# Patient Record
Sex: MALE | Race: OTHER | HISPANIC OR LATINO | URBAN - METROPOLITAN AREA
[De-identification: names, ages, dates, MRNs, and addresses within clinical notes are randomized per-mention and may not be internally consistent; named-entity substitution may affect disease eponyms.]

---

## 2022-01-16 ENCOUNTER — INPATIENT (INPATIENT)
Facility: HOSPITAL | Age: 77
LOS: 5 days | Discharge: HOME | DRG: 378 | End: 2022-01-22
Attending: HOSPITALIST | Admitting: STUDENT IN AN ORGANIZED HEALTH CARE EDUCATION/TRAINING PROGRAM
Payer: COMMERCIAL

## 2022-01-16 VITALS
OXYGEN SATURATION: 97 % | HEIGHT: 68 IN | DIASTOLIC BLOOD PRESSURE: 61 MMHG | WEIGHT: 210.1 LBS | TEMPERATURE: 98 F | HEART RATE: 81 BPM | SYSTOLIC BLOOD PRESSURE: 106 MMHG

## 2022-01-16 DIAGNOSIS — K92.1 MELENA: ICD-10-CM

## 2022-01-16 LAB
ALBUMIN SERPL ELPH-MCNC: 4.2 G/DL — SIGNIFICANT CHANGE UP (ref 3.3–5)
ALP SERPL-CCNC: 52 U/L — SIGNIFICANT CHANGE UP (ref 40–120)
ALT FLD-CCNC: 35 U/L — SIGNIFICANT CHANGE UP (ref 10–45)
ANION GAP SERPL CALC-SCNC: 13 MMOL/L — SIGNIFICANT CHANGE UP (ref 5–17)
APTT BLD: 24.8 SEC — LOW (ref 27.5–35.5)
AST SERPL-CCNC: 26 U/L — SIGNIFICANT CHANGE UP (ref 10–40)
BASE EXCESS BLDV CALC-SCNC: -0.3 MMOL/L — SIGNIFICANT CHANGE UP (ref -2–2)
BASOPHILS # BLD AUTO: 0.07 K/UL — SIGNIFICANT CHANGE UP (ref 0–0.2)
BASOPHILS NFR BLD AUTO: 0.4 % — SIGNIFICANT CHANGE UP (ref 0–2)
BILIRUB SERPL-MCNC: 0.4 MG/DL — SIGNIFICANT CHANGE UP (ref 0.2–1.2)
BLD GP AB SCN SERPL QL: NEGATIVE — SIGNIFICANT CHANGE UP
BUN SERPL-MCNC: 50 MG/DL — HIGH (ref 7–23)
CA-I SERPL-SCNC: 1.29 MMOL/L — SIGNIFICANT CHANGE UP (ref 1.15–1.33)
CALCIUM SERPL-MCNC: 9.9 MG/DL — SIGNIFICANT CHANGE UP (ref 8.4–10.5)
CHLORIDE BLDV-SCNC: 100 MMOL/L — SIGNIFICANT CHANGE UP (ref 96–108)
CHLORIDE SERPL-SCNC: 99 MMOL/L — SIGNIFICANT CHANGE UP (ref 96–108)
CO2 BLDV-SCNC: 29 MMOL/L — HIGH (ref 22–26)
CO2 SERPL-SCNC: 23 MMOL/L — SIGNIFICANT CHANGE UP (ref 22–31)
CREAT SERPL-MCNC: 1.31 MG/DL — HIGH (ref 0.5–1.3)
EOSINOPHIL # BLD AUTO: 0.04 K/UL — SIGNIFICANT CHANGE UP (ref 0–0.5)
EOSINOPHIL NFR BLD AUTO: 0.2 % — SIGNIFICANT CHANGE UP (ref 0–6)
GAS PNL BLDV: 133 MMOL/L — LOW (ref 136–145)
GAS PNL BLDV: SIGNIFICANT CHANGE UP
GAS PNL BLDV: SIGNIFICANT CHANGE UP
GLUCOSE BLDV-MCNC: 150 MG/DL — HIGH (ref 70–99)
GLUCOSE SERPL-MCNC: 159 MG/DL — HIGH (ref 70–99)
HCO3 BLDV-SCNC: 27 MMOL/L — SIGNIFICANT CHANGE UP (ref 22–29)
HCT VFR BLD CALC: 39 % — SIGNIFICANT CHANGE UP (ref 39–50)
HCT VFR BLDA CALC: 40 % — SIGNIFICANT CHANGE UP (ref 39–51)
HGB BLD CALC-MCNC: 13.2 G/DL — SIGNIFICANT CHANGE UP (ref 12.6–17.4)
HGB BLD-MCNC: 13 G/DL — SIGNIFICANT CHANGE UP (ref 13–17)
IMM GRANULOCYTES NFR BLD AUTO: 1.2 % — SIGNIFICANT CHANGE UP (ref 0–1.5)
INR BLD: 1.14 RATIO — SIGNIFICANT CHANGE UP (ref 0.88–1.16)
LACTATE BLDV-MCNC: 2.8 MMOL/L — HIGH (ref 0.7–2)
LYMPHOCYTES # BLD AUTO: 1.64 K/UL — SIGNIFICANT CHANGE UP (ref 1–3.3)
LYMPHOCYTES # BLD AUTO: 10 % — LOW (ref 13–44)
MAGNESIUM SERPL-MCNC: 1.7 MG/DL — SIGNIFICANT CHANGE UP (ref 1.6–2.6)
MCHC RBC-ENTMCNC: 32.4 PG — SIGNIFICANT CHANGE UP (ref 27–34)
MCHC RBC-ENTMCNC: 33.3 GM/DL — SIGNIFICANT CHANGE UP (ref 32–36)
MCV RBC AUTO: 97.3 FL — SIGNIFICANT CHANGE UP (ref 80–100)
MONOCYTES # BLD AUTO: 0.88 K/UL — SIGNIFICANT CHANGE UP (ref 0–0.9)
MONOCYTES NFR BLD AUTO: 5.4 % — SIGNIFICANT CHANGE UP (ref 2–14)
NEUTROPHILS # BLD AUTO: 13.6 K/UL — HIGH (ref 1.8–7.4)
NEUTROPHILS NFR BLD AUTO: 82.8 % — HIGH (ref 43–77)
NRBC # BLD: 0 /100 WBCS — SIGNIFICANT CHANGE UP (ref 0–0)
PCO2 BLDV: 55 MMHG — SIGNIFICANT CHANGE UP (ref 42–55)
PH BLDV: 7.3 — LOW (ref 7.32–7.43)
PHOSPHATE SERPL-MCNC: 2.8 MG/DL — SIGNIFICANT CHANGE UP (ref 2.5–4.5)
PLATELET # BLD AUTO: 294 K/UL — SIGNIFICANT CHANGE UP (ref 150–400)
PO2 BLDV: 18 MMHG — LOW (ref 25–45)
POTASSIUM BLDV-SCNC: 5.6 MMOL/L — HIGH (ref 3.5–5.1)
POTASSIUM SERPL-MCNC: 4.8 MMOL/L — SIGNIFICANT CHANGE UP (ref 3.5–5.3)
POTASSIUM SERPL-SCNC: 4.8 MMOL/L — SIGNIFICANT CHANGE UP (ref 3.5–5.3)
PROT SERPL-MCNC: 7.1 G/DL — SIGNIFICANT CHANGE UP (ref 6–8.3)
PROTHROM AB SERPL-ACNC: 13.6 SEC — SIGNIFICANT CHANGE UP (ref 10.6–13.6)
RBC # BLD: 4.01 M/UL — LOW (ref 4.2–5.8)
RBC # FLD: 11.9 % — SIGNIFICANT CHANGE UP (ref 10.3–14.5)
RH IG SCN BLD-IMP: POSITIVE — SIGNIFICANT CHANGE UP
SAO2 % BLDV: 21.5 % — LOW (ref 67–88)
SODIUM SERPL-SCNC: 135 MMOL/L — SIGNIFICANT CHANGE UP (ref 135–145)
WBC # BLD: 16.43 K/UL — HIGH (ref 3.8–10.5)
WBC # FLD AUTO: 16.43 K/UL — HIGH (ref 3.8–10.5)

## 2022-01-16 PROCEDURE — 93010 ELECTROCARDIOGRAM REPORT: CPT

## 2022-01-16 PROCEDURE — 99285 EMERGENCY DEPT VISIT HI MDM: CPT

## 2022-01-16 RX ORDER — PANTOPRAZOLE SODIUM 20 MG/1
80 TABLET, DELAYED RELEASE ORAL ONCE
Refills: 0 | Status: COMPLETED | OUTPATIENT
Start: 2022-01-16 | End: 2022-01-16

## 2022-01-16 RX ORDER — SODIUM CHLORIDE 9 MG/ML
1000 INJECTION, SOLUTION INTRAVENOUS ONCE
Refills: 0 | Status: COMPLETED | OUTPATIENT
Start: 2022-01-16 | End: 2022-01-16

## 2022-01-16 RX ORDER — SUCRALFATE 1 G
1 TABLET ORAL ONCE
Refills: 0 | Status: COMPLETED | OUTPATIENT
Start: 2022-01-16 | End: 2022-01-16

## 2022-01-16 RX ADMIN — SODIUM CHLORIDE 1000 MILLILITER(S): 9 INJECTION, SOLUTION INTRAVENOUS at 22:16

## 2022-01-16 RX ADMIN — PANTOPRAZOLE SODIUM 80 MILLIGRAM(S): 20 TABLET, DELAYED RELEASE ORAL at 21:13

## 2022-01-16 RX ADMIN — Medication 1 GRAM(S): at 21:14

## 2022-01-16 NOTE — ED PROVIDER NOTE - CLINICAL SUMMARY MEDICAL DECISION MAKING FREE TEXT BOX
Lucie - Pt is a 75 yo M with HTN and depression who presents with near syncope. concern for GI bleed given melena. cardiac or neuro cause for syncope less likely. will check cbc, cmp, ekg, coags, cxr Lucie - Pt is a 75 yo M with HTN and depression who presents with near syncope. concern for GI bleed given melena. cardiac or neuro cause for syncope less likely. will check cbc, cmp, ekg, coags, cxr    Attending Sakshi: 75 y/o M w/ PMH of HTN, CAD s/p stent on ASA presenting w/ dizziness. Seen in [facility], BIBEMS.  [id number]. Reportedly 2 episodes of dizziness yesterday and today. Described as feeling as if he was going to lose consciousness. Had been feeling well until this started, in usual state of health. Noticed while on EMS stretcher that there was blood on the sheets. Denies blood in the urine or stool that he is aware of. Pt well appearing on exam, no acute distress. Lungs clear. HR regular. Abd nondistended/soft/nontender. Rectal exam w/ melena. Pt presenting w/ dizziness in the setting of melena. On ASA. Suspect likely UGIB. HD stable at this time. Plan for labs, meds, EKG, CXR. Pt will require admission. Will reassess the need for additional interventions as clinically warranted.

## 2022-01-16 NOTE — ED PROVIDER NOTE - OBJECTIVE STATEMENT
Pt is a 77 yo M Pt is a 77 yo M with HTN and depression who presents with near syncope. Today and yesterday pt had 2 episodes of dizziness causing near syncope though pt denies LOC or hitting head. Has not noticed any melena or BRBPR until with EMS today. Denies chest pain, SOB, N/V, fevers, chills. No abd pain. Normal bowel movements, no urinary symptoms. No palpitations, headache, weakness, numbness. not on AC

## 2022-01-16 NOTE — ED PROVIDER NOTE - PHYSICAL EXAMINATION
Denisse Faulkner MD  GENERAL: Patient awake alert NAD.  HEENT: NC/AT, Moist mucous membranes, PERRL, EOMI.  LUNGS: CTAB, no wheezes or crackles.   CARDIAC: RRR, no m/r/g.    ABDOMEN: Soft, NT, ND, No rebound, guarding. No CVA tenderness.   rectal exam: scant melena. no brisk bleeding, no large melena BM  EXT: No edema. No calf tenderness.  MSK: no pain with movement, no deformities.  NEURO: A&Ox3. Moving all extremities.  SKIN: Warm and dry. No rash.  PSYCH: Normal affect.

## 2022-01-16 NOTE — ED ADULT NURSE REASSESSMENT NOTE - NS ED NURSE REASSESS COMMENT FT1
Pt had episode of bloody BM in ED. MD Cantor aware. Pt a&ox3, asymptomatic, mentating well. Pt cleaned up and changed, provided new linens and gown, placed in position of comfort. Pt NSR on CM, pending dispo

## 2022-01-16 NOTE — ED PROVIDER NOTE - ATTENDING CONTRIBUTION TO CARE
Attending Sakshi: I performed a history and physical exam of the patient and discussed their management with the resident/fellow/ACP/student. I have reviewed the resident/fellow/ACP/student note and agree with the documented findings and plan of care, except as noted. I have personally performed a substantive portion of the visit including all aspects of the medical decision making. My medical decision making and observations are found above. Please refer to any progress notes for updates on clinical course.

## 2022-01-17 DIAGNOSIS — K92.1 MELENA: ICD-10-CM

## 2022-01-17 DIAGNOSIS — D62 ACUTE POSTHEMORRHAGIC ANEMIA: ICD-10-CM

## 2022-01-17 DIAGNOSIS — Z29.9 ENCOUNTER FOR PROPHYLACTIC MEASURES, UNSPECIFIED: ICD-10-CM

## 2022-01-17 DIAGNOSIS — D72.829 ELEVATED WHITE BLOOD CELL COUNT, UNSPECIFIED: ICD-10-CM

## 2022-01-17 DIAGNOSIS — R55 SYNCOPE AND COLLAPSE: ICD-10-CM

## 2022-01-17 DIAGNOSIS — I25.10 ATHEROSCLEROTIC HEART DISEASE OF NATIVE CORONARY ARTERY WITHOUT ANGINA PECTORIS: ICD-10-CM

## 2022-01-17 DIAGNOSIS — N17.9 ACUTE KIDNEY FAILURE, UNSPECIFIED: ICD-10-CM

## 2022-01-17 LAB
ANION GAP SERPL CALC-SCNC: 14 MMOL/L — SIGNIFICANT CHANGE UP (ref 5–17)
APPEARANCE UR: CLEAR — SIGNIFICANT CHANGE UP
BACTERIA # UR AUTO: NEGATIVE — SIGNIFICANT CHANGE UP
BASE EXCESS BLDV CALC-SCNC: 1.5 MMOL/L — SIGNIFICANT CHANGE UP (ref -2–2)
BILIRUB UR-MCNC: NEGATIVE — SIGNIFICANT CHANGE UP
BUN SERPL-MCNC: 40 MG/DL — HIGH (ref 7–23)
CA-I SERPL-SCNC: 1.31 MMOL/L — SIGNIFICANT CHANGE UP (ref 1.15–1.33)
CALCIUM SERPL-MCNC: 9.5 MG/DL — SIGNIFICANT CHANGE UP (ref 8.4–10.5)
CHLORIDE BLDV-SCNC: 99 MMOL/L — SIGNIFICANT CHANGE UP (ref 96–108)
CHLORIDE SERPL-SCNC: 101 MMOL/L — SIGNIFICANT CHANGE UP (ref 96–108)
CO2 BLDV-SCNC: 30 MMOL/L — HIGH (ref 22–26)
CO2 SERPL-SCNC: 21 MMOL/L — LOW (ref 22–31)
COLOR SPEC: SIGNIFICANT CHANGE UP
CREAT SERPL-MCNC: 0.88 MG/DL — SIGNIFICANT CHANGE UP (ref 0.5–1.3)
DIFF PNL FLD: NEGATIVE — SIGNIFICANT CHANGE UP
EPI CELLS # UR: 0 /HPF — SIGNIFICANT CHANGE UP
GAS PNL BLDV: 132 MMOL/L — LOW (ref 136–145)
GAS PNL BLDV: SIGNIFICANT CHANGE UP
GAS PNL BLDV: SIGNIFICANT CHANGE UP
GLUCOSE BLDV-MCNC: 150 MG/DL — HIGH (ref 70–99)
GLUCOSE SERPL-MCNC: 111 MG/DL — HIGH (ref 70–99)
GLUCOSE UR QL: NEGATIVE — SIGNIFICANT CHANGE UP
HCO3 BLDV-SCNC: 28 MMOL/L — SIGNIFICANT CHANGE UP (ref 22–29)
HCT VFR BLD CALC: 31.9 % — LOW (ref 39–50)
HCT VFR BLD CALC: 34.1 % — LOW (ref 39–50)
HCT VFR BLD CALC: 35.1 % — LOW (ref 39–50)
HCT VFR BLD CALC: 35.8 % — LOW (ref 39–50)
HCT VFR BLDA CALC: 36 % — LOW (ref 39–51)
HGB BLD CALC-MCNC: 12 G/DL — LOW (ref 12.6–17.4)
HGB BLD-MCNC: 10.9 G/DL — LOW (ref 13–17)
HGB BLD-MCNC: 11.6 G/DL — LOW (ref 13–17)
HGB BLD-MCNC: 12 G/DL — LOW (ref 13–17)
HGB BLD-MCNC: 12 G/DL — LOW (ref 13–17)
HOROWITZ INDEX BLDV+IHG-RTO: SIGNIFICANT CHANGE UP
HYALINE CASTS # UR AUTO: 1 /LPF — SIGNIFICANT CHANGE UP (ref 0–2)
KETONES UR-MCNC: ABNORMAL
LACTATE BLDV-MCNC: 2.4 MMOL/L — HIGH (ref 0.7–2)
LEUKOCYTE ESTERASE UR-ACNC: NEGATIVE — SIGNIFICANT CHANGE UP
MAGNESIUM SERPL-MCNC: 1.7 MG/DL — SIGNIFICANT CHANGE UP (ref 1.6–2.6)
MCHC RBC-ENTMCNC: 32.4 PG — SIGNIFICANT CHANGE UP (ref 27–34)
MCHC RBC-ENTMCNC: 32.4 PG — SIGNIFICANT CHANGE UP (ref 27–34)
MCHC RBC-ENTMCNC: 32.8 PG — SIGNIFICANT CHANGE UP (ref 27–34)
MCHC RBC-ENTMCNC: 32.9 PG — SIGNIFICANT CHANGE UP (ref 27–34)
MCHC RBC-ENTMCNC: 33.5 GM/DL — SIGNIFICANT CHANGE UP (ref 32–36)
MCHC RBC-ENTMCNC: 34 GM/DL — SIGNIFICANT CHANGE UP (ref 32–36)
MCHC RBC-ENTMCNC: 34.2 GM/DL — SIGNIFICANT CHANGE UP (ref 32–36)
MCHC RBC-ENTMCNC: 34.2 GM/DL — SIGNIFICANT CHANGE UP (ref 32–36)
MCV RBC AUTO: 94.9 FL — SIGNIFICANT CHANGE UP (ref 80–100)
MCV RBC AUTO: 96.2 FL — SIGNIFICANT CHANGE UP (ref 80–100)
MCV RBC AUTO: 96.3 FL — SIGNIFICANT CHANGE UP (ref 80–100)
MCV RBC AUTO: 96.8 FL — SIGNIFICANT CHANGE UP (ref 80–100)
NITRITE UR-MCNC: NEGATIVE — SIGNIFICANT CHANGE UP
NRBC # BLD: 0 /100 WBCS — SIGNIFICANT CHANGE UP (ref 0–0)
PCO2 BLDV: 52 MMHG — SIGNIFICANT CHANGE UP (ref 42–55)
PH BLDV: 7.34 — SIGNIFICANT CHANGE UP (ref 7.32–7.43)
PH UR: 6.5 — SIGNIFICANT CHANGE UP (ref 5–8)
PHOSPHATE SERPL-MCNC: 4 MG/DL — SIGNIFICANT CHANGE UP (ref 2.5–4.5)
PLATELET # BLD AUTO: 266 K/UL — SIGNIFICANT CHANGE UP (ref 150–400)
PLATELET # BLD AUTO: 272 K/UL — SIGNIFICANT CHANGE UP (ref 150–400)
PLATELET # BLD AUTO: 274 K/UL — SIGNIFICANT CHANGE UP (ref 150–400)
PLATELET # BLD AUTO: 288 K/UL — SIGNIFICANT CHANGE UP (ref 150–400)
PO2 BLDV: 22 MMHG — LOW (ref 25–45)
POTASSIUM BLDV-SCNC: 4.9 MMOL/L — SIGNIFICANT CHANGE UP (ref 3.5–5.1)
POTASSIUM SERPL-MCNC: 3.5 MMOL/L — SIGNIFICANT CHANGE UP (ref 3.5–5.3)
POTASSIUM SERPL-SCNC: 3.5 MMOL/L — SIGNIFICANT CHANGE UP (ref 3.5–5.3)
PROT UR-MCNC: ABNORMAL
RBC # BLD: 3.36 M/UL — LOW (ref 4.2–5.8)
RBC # BLD: 3.54 M/UL — LOW (ref 4.2–5.8)
RBC # BLD: 3.65 M/UL — LOW (ref 4.2–5.8)
RBC # BLD: 3.7 M/UL — LOW (ref 4.2–5.8)
RBC # FLD: 11.9 % — SIGNIFICANT CHANGE UP (ref 10.3–14.5)
RBC CASTS # UR COMP ASSIST: 1 /HPF — SIGNIFICANT CHANGE UP (ref 0–4)
SAO2 % BLDV: 31.3 % — LOW (ref 67–88)
SARS-COV-2 RNA SPEC QL NAA+PROBE: SIGNIFICANT CHANGE UP
SODIUM SERPL-SCNC: 136 MMOL/L — SIGNIFICANT CHANGE UP (ref 135–145)
SP GR SPEC: 1.02 — SIGNIFICANT CHANGE UP (ref 1.01–1.02)
UROBILINOGEN FLD QL: NEGATIVE — SIGNIFICANT CHANGE UP
WBC # BLD: 10.55 K/UL — HIGH (ref 3.8–10.5)
WBC # BLD: 13.93 K/UL — HIGH (ref 3.8–10.5)
WBC # BLD: 7.07 K/UL — SIGNIFICANT CHANGE UP (ref 3.8–10.5)
WBC # BLD: 8.36 K/UL — SIGNIFICANT CHANGE UP (ref 3.8–10.5)
WBC # FLD AUTO: 10.55 K/UL — HIGH (ref 3.8–10.5)
WBC # FLD AUTO: 13.93 K/UL — HIGH (ref 3.8–10.5)
WBC # FLD AUTO: 7.07 K/UL — SIGNIFICANT CHANGE UP (ref 3.8–10.5)
WBC # FLD AUTO: 8.36 K/UL — SIGNIFICANT CHANGE UP (ref 3.8–10.5)
WBC UR QL: 1 /HPF — SIGNIFICANT CHANGE UP (ref 0–5)

## 2022-01-17 PROCEDURE — 99223 1ST HOSP IP/OBS HIGH 75: CPT

## 2022-01-17 PROCEDURE — 71045 X-RAY EXAM CHEST 1 VIEW: CPT | Mod: 26

## 2022-01-17 PROCEDURE — 12345: CPT | Mod: NC

## 2022-01-17 RX ORDER — VALSARTAN 80 MG/1
1 TABLET ORAL
Qty: 0 | Refills: 0 | DISCHARGE

## 2022-01-17 RX ORDER — ATORVASTATIN CALCIUM 80 MG/1
0 TABLET, FILM COATED ORAL
Qty: 0 | Refills: 0 | DISCHARGE

## 2022-01-17 RX ORDER — ASPIRIN/CALCIUM CARB/MAGNESIUM 324 MG
1 TABLET ORAL
Qty: 0 | Refills: 0 | DISCHARGE

## 2022-01-17 RX ORDER — ATORVASTATIN CALCIUM 80 MG/1
1 TABLET, FILM COATED ORAL
Qty: 0 | Refills: 0 | DISCHARGE

## 2022-01-17 RX ORDER — SODIUM CHLORIDE 9 MG/ML
1000 INJECTION INTRAMUSCULAR; INTRAVENOUS; SUBCUTANEOUS
Refills: 0 | Status: DISCONTINUED | OUTPATIENT
Start: 2022-01-17 | End: 2022-01-22

## 2022-01-17 RX ORDER — BRIMONIDINE TARTRATE 2 MG/MG
1 SOLUTION/ DROPS OPHTHALMIC
Qty: 0 | Refills: 0 | DISCHARGE

## 2022-01-17 RX ORDER — ONDANSETRON 8 MG/1
4 TABLET, FILM COATED ORAL EVERY 8 HOURS
Refills: 0 | Status: DISCONTINUED | OUTPATIENT
Start: 2022-01-17 | End: 2022-01-22

## 2022-01-17 RX ORDER — TIMOLOL 0.5 %
1 DROPS OPHTHALMIC (EYE)
Qty: 0 | Refills: 0 | DISCHARGE

## 2022-01-17 RX ORDER — ATORVASTATIN CALCIUM 80 MG/1
40 TABLET, FILM COATED ORAL AT BEDTIME
Refills: 0 | Status: DISCONTINUED | OUTPATIENT
Start: 2022-01-17 | End: 2022-01-22

## 2022-01-17 RX ORDER — ASPIRIN/CALCIUM CARB/MAGNESIUM 324 MG
81 TABLET ORAL DAILY
Refills: 0 | Status: DISCONTINUED | OUTPATIENT
Start: 2022-01-18 | End: 2022-01-19

## 2022-01-17 RX ORDER — PANTOPRAZOLE SODIUM 20 MG/1
40 TABLET, DELAYED RELEASE ORAL DAILY
Refills: 0 | Status: DISCONTINUED | OUTPATIENT
Start: 2022-01-17 | End: 2022-01-17

## 2022-01-17 RX ORDER — CYCLOSPORINE 0.5 MG/ML
1 EMULSION OPHTHALMIC
Qty: 0 | Refills: 0 | DISCHARGE

## 2022-01-17 RX ORDER — LANOLIN ALCOHOL/MO/W.PET/CERES
3 CREAM (GRAM) TOPICAL AT BEDTIME
Refills: 0 | Status: DISCONTINUED | OUTPATIENT
Start: 2022-01-17 | End: 2022-01-22

## 2022-01-17 RX ORDER — PANTOPRAZOLE SODIUM 20 MG/1
40 TABLET, DELAYED RELEASE ORAL EVERY 12 HOURS
Refills: 0 | Status: DISCONTINUED | OUTPATIENT
Start: 2022-01-17 | End: 2022-01-19

## 2022-01-17 RX ORDER — ACETAMINOPHEN 500 MG
650 TABLET ORAL EVERY 6 HOURS
Refills: 0 | Status: DISCONTINUED | OUTPATIENT
Start: 2022-01-17 | End: 2022-01-22

## 2022-01-17 RX ORDER — METFORMIN HYDROCHLORIDE 850 MG/1
1 TABLET ORAL
Qty: 0 | Refills: 0 | DISCHARGE

## 2022-01-17 RX ADMIN — PANTOPRAZOLE SODIUM 40 MILLIGRAM(S): 20 TABLET, DELAYED RELEASE ORAL at 17:34

## 2022-01-17 RX ADMIN — ATORVASTATIN CALCIUM 40 MILLIGRAM(S): 80 TABLET, FILM COATED ORAL at 22:36

## 2022-01-17 RX ADMIN — SODIUM CHLORIDE 75 MILLILITER(S): 9 INJECTION INTRAMUSCULAR; INTRAVENOUS; SUBCUTANEOUS at 22:36

## 2022-01-17 NOTE — H&P ADULT - NSHPPHYSICALEXAM_GEN_ALL_CORE
Vital Signs Last 24 Hrs  T(C): 36.7 (17 Jan 2022 01:38), Max: 36.7 (17 Jan 2022 01:38)  T(F): 98 (17 Jan 2022 01:38), Max: 98 (17 Jan 2022 01:38)  HR: 73 (17 Jan 2022 03:14) (73 - 82)  BP: 120/77 (17 Jan 2022 03:14) (106/61 - 124/77)  BP(mean): --  RR: 15 (17 Jan 2022 03:14) (15 - 20)  SpO2: 98% (17 Jan 2022 03:14) (96% - 98%)

## 2022-01-17 NOTE — PROGRESS NOTE ADULT - PROBLEM SELECTOR PLAN 6
- DVT ppx: SCDs given melena  - GI ppx: protonix      Dispo: pending cards clearance and EGD    discussed with GI team and NAIMA Ferrell S/p FRANCO in 2008, on ASA 81mg daily at home  - no cp, sob  - restart aspirin per GI  - continue atorvastatin  - hold BP meds  - TTE pending  - cards consulted

## 2022-01-17 NOTE — H&P ADULT - PROBLEM SELECTOR PLAN 2
- likely secondary to hypovoluma from melena  - obtain orthostatic vital signs  - s/p 1L of fluids in the ED  - obtain TTE

## 2022-01-17 NOTE — H&P ADULT - ASSESSMENT
77 yo M with HTN, CAD s/p FRANCO in 2008 (on aspirin), and known gastric ulcer (diagnosed in 1999) who presents with near syncope in the setting of melena.

## 2022-01-17 NOTE — CONSULT NOTE ADULT - SUBJECTIVE AND OBJECTIVE BOX
Date of Service :   01-17-22 @ 15:57  CHIEF COMPLAINT:Patient is a 76y old  Male who presents with a chief complaint of Presyncope in the setting of melena (17 Jan 2022 13:09)      HISTORY OF PRESENT ILLNESS:HPI:  75 yo M with HTN, CAD s/p FRANCO in 2008 (on aspirin), and known gastric ulcer (diagnosed in 1999) who presents with near syncope. Patient states he typically has some mild dizziness associated with getting from seated positions and related to car motion sickness; however, starting Saturday Jan 15th, the patient experienced worsening dizziness. This first time on Saturday was moderate, occured at rest and resolved on its own. He then developed dizziness the day of presentation, Sunday Jan 16th. This occured at his friend's house while playing dominos. He felt persistent dizziness as well as nausea. He asked his friend to help him to the restroom; however, he could not make it there and vomited on the floor. His sustained no trauma as his friend was able to lower him to the ground. He denies any loss of consciousness, chest pain, or palpitations during this event.     En route to the ED, EMS reported an episode of melena during transfer and while in the ED, the patient had an additional episode of bloody bowel movement.    Of note, the patient has a history of gastric ulcer, diagnosed in 1999 and related to his then history of smoking and alcohol use. He states he was in the hospital for 30 days during this evaluation. Since then he has not used alcohol or smoked cigarettes. He gets regular colonoscopies and endoscopies, last one reportedly 8 months ago and normal. He states he gets them every few years.    Vitals: afebrile, HR 81, /61, SpO2 97% on room air.  Labs: leukocytosis with neutrophilic predominance. CMP significant for elevated BUN of 50 and creatinine of 1.31 (GFR 53). There are no prior labs to compare. Type and screen was collected. U/A is negative for UTI.  CXR: clear lungs    ED interventions: protonix 80mg IV x1, sucralfate, LR 1L bolus. (17 Jan 2022 02:35)      PAST MEDICAL & SURGICAL HISTORY:  HTN (hypertension)    CAD (coronary artery disease)  s/p stent in 2008    Gastric ulcer  diagnosed in 1999    No significant past surgical history            MEDICATIONS:        acetaminophen     Tablet .. 650 milliGRAM(s) Oral every 6 hours PRN  melatonin 3 milliGRAM(s) Oral at bedtime PRN  ondansetron Injectable 4 milliGRAM(s) IV Push every 8 hours PRN    aluminum hydroxide/magnesium hydroxide/simethicone Suspension 30 milliLiter(s) Oral every 4 hours PRN  pantoprazole  Injectable 40 milliGRAM(s) IV Push every 12 hours    atorvastatin 40 milliGRAM(s) Oral at bedtime    sodium chloride 0.9%. 1000 milliLiter(s) IV Continuous <Continuous>      FAMILY HISTORY:  FH: diabetes mellitus (Mother)        Non-contributory    SOCIAL HISTORY:    [ ] Tobacco  [ ] Drugs  [ ] Alcohol    Allergies    No Known Allergies    Intolerances    	    REVIEW OF SYSTEMS:  CONSTITUTIONAL: No fever  EYES: No eye pain, visual disturbances, or discharge  ENMT:  No difficulty hearing, tinnitus  NECK: No pain or stiffness  RESPIRATORY: No cough, wheezing,  CARDIOVASCULAR: No chest pain, palpitations, passing out, dizziness, or leg swelling  GASTROINTESTINAL:  No nausea, vomiting, diarrhea or constipation. No melena.  GENITOURINARY: No dysuria, hematuria  NEUROLOGICAL: No stroke like symptoms  SKIN: No burning or lesions   ENDOCRINE: No heat or cold intolerance  MUSCULOSKELETAL: No joint pain or swelling  PSYCHIATRIC: No  anxiety, mood swings  HEME/LYMPH: No bleeding gums  ALLERGY AND IMMUNOLOGIC: No hives or eczema	    All other ROS negative    PHYSICAL EXAM:  T(C): 36.7 (01-17-22 @ 09:45), Max: 36.7 (01-17-22 @ 01:38)  HR: 72 (01-17-22 @ 06:01) (72 - 82)  BP: 108/64 (01-17-22 @ 06:01) (106/61 - 124/77)  RR: 16 (01-17-22 @ 06:01) (15 - 20)  SpO2: 96% (01-17-22 @ 06:01) (96% - 98%)  Wt(kg): --  I&O's Summary    16 Jan 2022 07:01  -  17 Jan 2022 07:00  --------------------------------------------------------  IN: 0 mL / OUT: 500 mL / NET: -500 mL        Appearance: Normal	  HEENT:   Normal oral mucosa, EOMI	  Cardiovascular:  S1 S2, No JVD,    Respiratory: Lungs clear to auscultation	  Psychiatry: Alert  Gastrointestinal:  Soft, Non-tender, + BS	  Skin: No rashes   Neurologic: Non-focal  Extremities:  No edema  Vascular: Peripheral pulses palpable    	    	  	  CARDIAC MARKERS:  Labs personally reviewed by me                                  12.0   8.36  )-----------( 274      ( 17 Jan 2022 14:15 )             35.8     01-17    136  |  101  |  40<H>  ----------------------------<  111<H>  3.5   |  21<L>  |  0.88    Ca    9.5      17 Jan 2022 07:14  Phos  4.0     01-17  Mg     1.7     01-17    TPro  7.1  /  Alb  4.2  /  TBili  0.4  /  DBili  x   /  AST  26  /  ALT  35  /  AlkPhos  52  01-16          EKG: Personally reviewed by me - 80bpm SR   Radiology: Personally reviewed by me -   < from: Xray Chest 1 View- PORTABLE-Urgent (Xray Chest 1 View- PORTABLE-Urgent .) (01.17.22 @ 00:10) >    HEART: normal in size  LUNGS: free of consolidation or effusion.  BONES: within normal limits      < end of copied text >      Assessment /Plan:   75 yo M with HTN, CAD s/p FRANCO in 2008 (on aspirin), and known gastric ulcer (diagnosed in 1999) who presents with near syncope in the setting melena admitted with acute blood loss anemia likely due to UGIB.     Problem/Plan - 1:  ·  Problem: Risk Stratification   - EKG noted is not showing tachy/shania syndrome   - pt is hemodynamically stable   -       Problem/Plan - 2:  ·  Problem: CAD S/p FRANCO in 2008,   - c/w home ASA 81mg daily and statin   - TTE pending    Problem/Plan - 3:  ·  Problem: HTN   - c/w home meds and hold within parameters   - monitor BP   - check orthostatics    Problem/Plan - 4:  ·  Problem: Acute blood loss anemia likely UGIB,  hx of gastric ulcer  -hgb downtrending 11.6 from 13 on admit  -HD stable, monitor vitals  -no further melena reported per patient  -trend cbc q6 hours  -discussed with GI, plan for EGD Wednesday or late add on Tuesday but needs cards clearance  -discussed with GI, can be on clears today, NPO after MN and can c/w ASA  -transfuse for hgb<7 or if HD instability  -increase ppi to 40mg IV q12.PATY (acute kidney injury).   ·  Plan: prerenal due to hypovolemia from GIB  -cr improved, likely back to baseline after IVF  -trend bmp.    Problem/Plan - 5:Near syncope.   ·  Plan: - likely secondary to hypovolemic from melena  - orthostatic vitals negative by criteria but bp low normal asymptomatic  --c/w IVF  -TTE pending    Problem/Plan -6 ·  Problem:- DVT ppx: SCDs given melena        Curt Barton FNP-BC   Eddie Lucero DO [facility]  Cardiovascular Medicine  [address] [place] [address] Dr, [address]  Office [phone]  Cell [phone] Date of Service :   01-17-22 @ 15:57  CHIEF COMPLAINT:Patient is a 76y old  Male who presents with a chief complaint of Presyncope in the setting of melena (17 Jan 2022 13:09)      HISTORY OF PRESENT ILLNESS:HPI:  75 yo M with HTN, CAD s/p FRANCO in 2008 (on aspirin), and known gastric ulcer (diagnosed in 1999) who presents with near syncope. Patient states he typically has some mild dizziness associated with getting from seated positions and related to car motion sickness; however, starting Saturday Jan 15th, the patient experienced worsening dizziness. This first time on Saturday was moderate, occured at rest and resolved on its own. He then developed dizziness the day of presentation, Sunday Jan 16th. This occured at his friend's house while playing dominos. He felt persistent dizziness as well as nausea. He asked his friend to help him to the restroom; however, he could not make it there and vomited on the floor. His sustained no trauma as his friend was able to lower him to the ground. He denies any loss of consciousness, chest pain, or palpitations during this event.     En route to the ED, EMS reported an episode of melena during transfer and while in the ED, the patient had an additional episode of bloody bowel movement.    Of note, the patient has a history of gastric ulcer, diagnosed in 1999 and related to his then history of smoking and alcohol use. He states he was in the hospital for 30 days during this evaluation. Since then he has not used alcohol or smoked cigarettes. He gets regular colonoscopies and endoscopies, last one reportedly 8 months ago and normal. He states he gets them every few years.    Vitals: afebrile, HR 81, /61, SpO2 97% on room air.  Labs: leukocytosis with neutrophilic predominance. CMP significant for elevated BUN of 50 and creatinine of 1.31 (GFR 53). There are no prior labs to compare. Type and screen was collected. U/A is negative for UTI.  CXR: clear lungs    ED interventions: protonix 80mg IV x1, sucralfate, LR 1L bolus. (17 Jan 2022 02:35)      PAST MEDICAL & SURGICAL HISTORY:  HTN (hypertension)    CAD (coronary artery disease)  s/p stent in 2008    Gastric ulcer  diagnosed in 1999    No significant past surgical history            MEDICATIONS:        acetaminophen     Tablet .. 650 milliGRAM(s) Oral every 6 hours PRN  melatonin 3 milliGRAM(s) Oral at bedtime PRN  ondansetron Injectable 4 milliGRAM(s) IV Push every 8 hours PRN    aluminum hydroxide/magnesium hydroxide/simethicone Suspension 30 milliLiter(s) Oral every 4 hours PRN  pantoprazole  Injectable 40 milliGRAM(s) IV Push every 12 hours    atorvastatin 40 milliGRAM(s) Oral at bedtime    sodium chloride 0.9%. 1000 milliLiter(s) IV Continuous <Continuous>      FAMILY HISTORY:  FH: diabetes mellitus (Mother)        Non-contributory    SOCIAL HISTORY:    [ ] Tobacco  [ ] Drugs  [ ] Alcohol    Allergies    No Known Allergies    Intolerances    	    REVIEW OF SYSTEMS:  CONSTITUTIONAL: No fever  EYES: No eye pain, visual disturbances, or discharge  ENMT:  No difficulty hearing, tinnitus  NECK: No pain or stiffness  RESPIRATORY: No cough, wheezing,  CARDIOVASCULAR: No chest pain, palpitations, passing out, dizziness, or leg swelling  GASTROINTESTINAL:  No nausea, vomiting, diarrhea or constipation. No melena.  GENITOURINARY: No dysuria, hematuria  NEUROLOGICAL: No stroke like symptoms  SKIN: No burning or lesions   ENDOCRINE: No heat or cold intolerance  MUSCULOSKELETAL: No joint pain or swelling  PSYCHIATRIC: No  anxiety, mood swings  HEME/LYMPH: No bleeding gums  ALLERGY AND IMMUNOLOGIC: No hives or eczema	    All other ROS negative    PHYSICAL EXAM:  T(C): 36.7 (01-17-22 @ 09:45), Max: 36.7 (01-17-22 @ 01:38)  HR: 72 (01-17-22 @ 06:01) (72 - 82)  BP: 108/64 (01-17-22 @ 06:01) (106/61 - 124/77)  RR: 16 (01-17-22 @ 06:01) (15 - 20)  SpO2: 96% (01-17-22 @ 06:01) (96% - 98%)  Wt(kg): --  I&O's Summary    16 Jan 2022 07:01  -  17 Jan 2022 07:00  --------------------------------------------------------  IN: 0 mL / OUT: 500 mL / NET: -500 mL        Appearance: Normal	  HEENT:   Normal oral mucosa, EOMI	  Cardiovascular:  S1 S2, No JVD,    Respiratory: Lungs clear to auscultation	  Psychiatry: Alert  Gastrointestinal:  Soft, Non-tender, + BS	  Skin: No rashes   Neurologic: Non-focal  Extremities:  No edema  Vascular: Peripheral pulses palpable    	    	  	  CARDIAC MARKERS:  Labs personally reviewed by me                                  12.0   8.36  )-----------( 274      ( 17 Jan 2022 14:15 )             35.8     01-17    136  |  101  |  40<H>  ----------------------------<  111<H>  3.5   |  21<L>  |  0.88    Ca    9.5      17 Jan 2022 07:14  Phos  4.0     01-17  Mg     1.7     01-17    TPro  7.1  /  Alb  4.2  /  TBili  0.4  /  DBili  x   /  AST  26  /  ALT  35  /  AlkPhos  52  01-16          EKG: Personally reviewed by me - 80bpm SR   Radiology: Personally reviewed by me -   < from: Xray Chest 1 View- PORTABLE-Urgent (Xray Chest 1 View- PORTABLE-Urgent .) (01.17.22 @ 00:10) >    HEART: normal in size  LUNGS: free of consolidation or effusion.  BONES: within normal limits      < end of copied text >      Assessment /Plan:   75 yo M with HTN, CAD s/p FRANCO in 2008 (on aspirin), and known gastric ulcer (diagnosed in 1999) who presents with near syncope in the setting melena admitted with acute blood loss anemia likely due to UGIB. Pt reports having ECHO done about 4 months ago and was reportedly normal. Pt had cardiac cath 2-3 years ago and no stent was needed. Prior to admission pt had no issue going up stairs and walking without CP.     Problem/Plan - 1:  ·  Problem: Risk Stratification   - EKG noted is not showing tachy/shania syndrome   - pt is hemodynamically stable   - denies any distress,  - pt looks euvolemic doesn't appear to be in decompensated heart failure   - pt is moderate risk for EGD, no contraindication to proceed     Problem/Plan - 2:  ·  Problem: CAD S/p FRANCO in 2008,   - c/w home ASA 81mg daily and statin   - TTE pending    Problem/Plan - 3:  ·  Problem: HTN   - c/w home meds and hold within parameters   - monitor BP   - check orthostatics    Problem/Plan - 4:  ·  Problem: Acute blood loss anemia likely UGIB,  hx of gastric ulcer  - Hgb downtrending 11.6 from 13 on admit  - Monitor CBC  - plan for EGD Wednesday or late add on Tuesday but needs cards clearance  - transfuse for hgb<7 or if HD instability  - increase PPI to 40mg IV q12.PATY (acute kidney injury).     Problem/Plan - 5 :  ·	Problem: Near syncope.   - likely 2/2 to hypovolemic from melena  - orthostatic vitals negative by criteria but bp low normal asymptomatic  - c/w IVF  - TTE pending    Problem/Plan -6: Problem:- DVT ppx: SCDs given melena        Curt Barton FN-BC   Eddie Lucero DO [facility]  Cardiovascular Medicine  [address] [place] [address], [address]  Office [phone]  Cell [phone] Date of Service :   01-17-22 @ 15:57  CHIEF COMPLAINT:Patient is a 76y old  Male who presents with a chief complaint of Presyncope in the setting of melena (17 Jan 2022 13:09)      HISTORY OF PRESENT ILLNESS:HPI:  75 yo M with HTN, CAD s/p FRANCO in 2008 (on aspirin), and known gastric ulcer (diagnosed in 1999) who presents with near syncope. Patient states he typically has some mild dizziness associated with getting from seated positions and related to car motion sickness; however, starting Saturday Jan 15th, the patient experienced worsening dizziness. This first time on Saturday was moderate, occured at rest and resolved on its own. He then developed dizziness the day of presentation, Sunday Jan 16th. This occured at his friend's house while playing dominos. He felt persistent dizziness as well as nausea. He asked his friend to help him to the restroom; however, he could not make it there and vomited on the floor. His sustained no trauma as his friend was able to lower him to the ground. He denies any loss of consciousness, chest pain, or palpitations during this event.     En route to the ED, EMS reported an episode of melena during transfer and while in the ED, the patient had an additional episode of bloody bowel movement.    Of note, the patient has a history of gastric ulcer, diagnosed in 1999 and related to his then history of smoking and alcohol use. He states he was in the hospital for 30 days during this evaluation. Since then he has not used alcohol or smoked cigarettes. He gets regular colonoscopies and endoscopies, last one reportedly 8 months ago and normal. He states he gets them every few years.    Vitals: afebrile, HR 81, /61, SpO2 97% on room air.  Labs: leukocytosis with neutrophilic predominance. CMP significant for elevated BUN of 50 and creatinine of 1.31 (GFR 53). There are no prior labs to compare. Type and screen was collected. U/A is negative for UTI.  CXR: clear lungs    ED interventions: protonix 80mg IV x1, sucralfate, LR 1L bolus. (17 Jan 2022 02:35)      PAST MEDICAL & SURGICAL HISTORY:  HTN (hypertension)    CAD (coronary artery disease)  s/p stent in 2008    Gastric ulcer  diagnosed in 1999    No significant past surgical history            MEDICATIONS:        acetaminophen     Tablet .. 650 milliGRAM(s) Oral every 6 hours PRN  melatonin 3 milliGRAM(s) Oral at bedtime PRN  ondansetron Injectable 4 milliGRAM(s) IV Push every 8 hours PRN    aluminum hydroxide/magnesium hydroxide/simethicone Suspension 30 milliLiter(s) Oral every 4 hours PRN  pantoprazole  Injectable 40 milliGRAM(s) IV Push every 12 hours    atorvastatin 40 milliGRAM(s) Oral at bedtime    sodium chloride 0.9%. 1000 milliLiter(s) IV Continuous <Continuous>      FAMILY HISTORY:  FH: diabetes mellitus (Mother)        Non-contributory    SOCIAL HISTORY:    [ ] not a smoker    Allergies    No Known Allergies    Intolerances    	    REVIEW OF SYSTEMS:  CONSTITUTIONAL: No fever  EYES: No eye pain, visual disturbances, or discharge  ENMT:  No difficulty hearing, tinnitus  NECK: No pain or stiffness  RESPIRATORY: No cough, wheezing,  CARDIOVASCULAR: No chest pain, palpitations, passing out, dizziness, or leg swelling  GASTROINTESTINAL:  No nausea, vomiting, diarrhea or constipation. No melena.  GENITOURINARY: + melena  NEUROLOGICAL: No stroke like symptoms  SKIN: No burning or lesions   ENDOCRINE: No heat or cold intolerance  MUSCULOSKELETAL: No joint pain or swelling  PSYCHIATRIC: No  anxiety, mood swings  HEME/LYMPH: No bleeding gums  ALLERGY AND IMMUNOLOGIC: No hives or eczema	    All other ROS negative    PHYSICAL EXAM:  T(C): 36.7 (01-17-22 @ 09:45), Max: 36.7 (01-17-22 @ 01:38)  HR: 72 (01-17-22 @ 06:01) (72 - 82)  BP: 108/64 (01-17-22 @ 06:01) (106/61 - 124/77)  RR: 16 (01-17-22 @ 06:01) (15 - 20)  SpO2: 96% (01-17-22 @ 06:01) (96% - 98%)  Wt(kg): --  I&O's Summary    16 Jan 2022 07:01  -  17 Jan 2022 07:00  --------------------------------------------------------  IN: 0 mL / OUT: 500 mL / NET: -500 mL        Appearance: Normal	  HEENT:   Normal oral mucosa, EOMI	  Cardiovascular:  S1 S2, No JVD,    Respiratory: Lungs clear to auscultation	  Psychiatry: Alert  Gastrointestinal:  Soft, Non-tender, + BS	  Skin: No rashes   Neurologic: Non-focal  Extremities:  No edema  Vascular: Peripheral pulses palpable    	    	  	  CARDIAC MARKERS:  Labs personally reviewed by me                                  12.0   8.36  )-----------( 274      ( 17 Jan 2022 14:15 )             35.8     01-17    136  |  101  |  40<H>  ----------------------------<  111<H>  3.5   |  21<L>  |  0.88    Ca    9.5      17 Jan 2022 07:14  Phos  4.0     01-17  Mg     1.7     01-17    TPro  7.1  /  Alb  4.2  /  TBili  0.4  /  DBili  x   /  AST  26  /  ALT  35  /  AlkPhos  52  01-16          EKG: Personally reviewed by me - 80bpm SR   Radiology: Personally reviewed by me -   < from: Xray Chest 1 View- PORTABLE-Urgent (Xray Chest 1 View- PORTABLE-Urgent .) (01.17.22 @ 00:10) >    HEART: normal in size  LUNGS: free of consolidation or effusion.  BONES: within normal limits      < end of copied text >      Assessment /Plan:   75 yo M with HTN, CAD s/p FRANCO in 2008 (on aspirin), and known gastric ulcer (diagnosed in 1999) who presents with near syncope in the setting melena admitted with acute blood loss anemia likely due to UGIB. Pt reports having ECHO done about 4 months ago and was reportedly normal. Pt had cardiac cath 2-3 years ago and no stent was needed. Prior to admission pt had no issue going up stairs and walking without CP.     Problem/Plan - 1:  ·  Problem: Risk Stratification   - EKG non-ischemic  -  No signs of tachy/shania syndrome   - pt is hemodynamically stable   - pt looks euvolemic doesn't appear to be in decompensated heart failure   - pt is moderate risk for EGD, no contraindication to proceed     Problem/Plan - 2:  ·  Problem: CAD S/p FRANCO in 2008,   - c/w home ASA 81mg daily and statin   - TTE pending    Problem/Plan - 3:  ·  Problem: HTN   - c/w home meds and hold within parameters   - monitor BP   - check orthostatics    Problem/Plan - 4:  ·  Problem: Acute blood loss anemia likely UGIB,  hx of gastric ulcer  - Hgb downtrending 11.6 from 13 on admit  - Monitor CBC  - plan for EGD Wednesday or late add on Tuesday but needs cards clearance  - transfuse for hgb<7 or if HD instability  - increase PPI to 40mg IV q12.PATY (acute kidney injury).     Problem/Plan - 5 :  ·	Problem: Near syncope.   - likely 2/2 to hypovolemic from melena  - orthostatic vitals negative by criteria but bp low normal asymptomatic  - c/w IVF  - TTE pending    Problem/Plan -6: Problem:- DVT ppx: SCDs given melena      Advanced care planning/advanced directives discussed with patient/family. DNR status including forceful chest compressions to attempt to restart the heart, ventilator support/artificial breathing, electric shock, artificial nutrition, health care proxy, Molst form all discussed with pt. Pt wishes to consider.  More than fifteen minutes spent on discussing advanced directives. OMT on six regions for acute somatic dysfunctions done at the bedside       Curt Barton [facility]-BC   Eddie Lucero DO [facility]  Cardiovascular Medicine  [address] [place] [address] Dr, [address]  Office [phone]  Cell [phone] Date of Service :   01-17-22 @ 15:57  CHIEF COMPLAINT:Patient is a 76y old  Male who presents with a chief complaint of Presyncope in the setting of melena (17 Jan 2022 13:09)      HISTORY OF PRESENT ILLNESS:HPI:  75 yo M with HTN, CAD s/p FRANCO in 2008 (on aspirin), and known gastric ulcer (diagnosed in 1999) who presents with near syncope. Patient states he typically has some mild dizziness associated with getting from seated positions and related to car motion sickness; however, starting Saturday Jan 15th, the patient experienced worsening dizziness. This first time on Saturday was moderate, occured at rest and resolved on its own. He then developed dizziness the day of presentation, Sunday Jan 16th. This occured at his friend's house while playing dominos. He felt persistent dizziness as well as nausea. He asked his friend to help him to the restroom; however, he could not make it there and vomited on the floor. His sustained no trauma as his friend was able to lower him to the ground. He denies any loss of consciousness, chest pain, or palpitations during this event.     En route to the ED, EMS reported an episode of melena during transfer and while in the ED, the patient had an additional episode of bloody bowel movement.    Of note, the patient has a history of gastric ulcer, diagnosed in 1999 and related to his then history of smoking and alcohol use. He states he was in the hospital for 30 days during this evaluation. Since then he has not used alcohol or smoked cigarettes. He gets regular colonoscopies and endoscopies, last one reportedly 8 months ago and normal. He states he gets them every few years.    Vitals: afebrile, HR 81, /61, SpO2 97% on room air.  Labs: leukocytosis with neutrophilic predominance. CMP significant for elevated BUN of 50 and creatinine of 1.31 (GFR 53). There are no prior labs to compare. Type and screen was collected. U/A is negative for UTI.  CXR: clear lungs    ED interventions: protonix 80mg IV x1, sucralfate, LR 1L bolus. (17 Jan 2022 02:35)      PAST MEDICAL & SURGICAL HISTORY:  HTN (hypertension)    CAD (coronary artery disease)  s/p stent in 2008    Gastric ulcer  diagnosed in 1999    No significant past surgical history            MEDICATIONS:        acetaminophen     Tablet .. 650 milliGRAM(s) Oral every 6 hours PRN  melatonin 3 milliGRAM(s) Oral at bedtime PRN  ondansetron Injectable 4 milliGRAM(s) IV Push every 8 hours PRN    aluminum hydroxide/magnesium hydroxide/simethicone Suspension 30 milliLiter(s) Oral every 4 hours PRN  pantoprazole  Injectable 40 milliGRAM(s) IV Push every 12 hours    atorvastatin 40 milliGRAM(s) Oral at bedtime    sodium chloride 0.9%. 1000 milliLiter(s) IV Continuous <Continuous>      FAMILY HISTORY:  FH: diabetes mellitus (Mother)        Non-contributory    SOCIAL HISTORY:    [ ] not a smoker    Allergies    No Known Allergies    Intolerances    	    REVIEW OF SYSTEMS:  CONSTITUTIONAL: No fever  EYES: No eye pain, visual disturbances, or discharge  ENMT:  No difficulty hearing, tinnitus  NECK: No pain or stiffness  RESPIRATORY: No cough, wheezing,  CARDIOVASCULAR: No chest pain, palpitations, passing out, dizziness, or leg swelling  GASTROINTESTINAL:  No nausea, vomiting, diarrhea or constipation. No melena.  GENITOURINARY: + melena  NEUROLOGICAL: No stroke like symptoms  SKIN: No burning or lesions   ENDOCRINE: No heat or cold intolerance  MUSCULOSKELETAL: No joint pain or swelling  PSYCHIATRIC: No  anxiety, mood swings  HEME/LYMPH: No bleeding gums  ALLERGY AND IMMUNOLOGIC: No hives or eczema	    All other ROS negative    PHYSICAL EXAM:  T(C): 36.7 (01-17-22 @ 09:45), Max: 36.7 (01-17-22 @ 01:38)  HR: 72 (01-17-22 @ 06:01) (72 - 82)  BP: 108/64 (01-17-22 @ 06:01) (106/61 - 124/77)  RR: 16 (01-17-22 @ 06:01) (15 - 20)  SpO2: 96% (01-17-22 @ 06:01) (96% - 98%)  Wt(kg): --  I&O's Summary    16 Jan 2022 07:01  -  17 Jan 2022 07:00  --------------------------------------------------------  IN: 0 mL / OUT: 500 mL / NET: -500 mL        Appearance: Normal	  HEENT:   Normal oral mucosa, EOMI	  Cardiovascular:  S1 S2, No JVD,    Respiratory: Lungs clear to auscultation	  Psychiatry: Alert  Gastrointestinal:  Soft, Non-tender, + BS	  Skin: No rashes   Neurologic: Non-focal  Extremities:  No edema  Vascular: Peripheral pulses palpable    	    	  	  CARDIAC MARKERS:  Labs personally reviewed by me                                  12.0   8.36  )-----------( 274      ( 17 Jan 2022 14:15 )             35.8     01-17    136  |  101  |  40<H>  ----------------------------<  111<H>  3.5   |  21<L>  |  0.88    Ca    9.5      17 Jan 2022 07:14  Phos  4.0     01-17  Mg     1.7     01-17    TPro  7.1  /  Alb  4.2  /  TBili  0.4  /  DBili  x   /  AST  26  /  ALT  35  /  AlkPhos  52  01-16          EKG: Personally reviewed by me - 80bpm SR   Radiology: Personally reviewed by me -   < from: Xray Chest 1 View- PORTABLE-Urgent (Xray Chest 1 View- PORTABLE-Urgent .) (01.17.22 @ 00:10) >    HEART: normal in size  LUNGS: free of consolidation or effusion.  BONES: within normal limits      < end of copied text >      Assessment /Plan:   75 yo M with HTN, CAD s/p FRANCO in 2008 (on aspirin), and known gastric ulcer (diagnosed in 1999) who presents with near syncope in the setting melena admitted with acute blood loss anemia likely due to UGIB. Pt reports having ECHO done about 4 months ago and was reportedly normal. Pt had cardiac cath 2-3 years ago and no stent was needed. Prior to admission pt had no issue going up stairs and walking without CP.     Problem/Plan - 1:  ·  Problem: Risk Stratification   - pt in good functional capacity  - EKG non-ischemic  - No signs of tachy/shania syndrome   - pt is hemodynamically stable   - pt looks euvolemic doesn't appear to be in decompensated heart failure   - pt is moderate risk for EGD, no contraindication to proceed     Problem/Plan - 2:  ·  Problem: CAD S/p FRANCO in 2008,   - c/w home ASA 81mg daily and statin   - TTE pending    Problem/Plan - 3:  ·  Problem: HTN   - c/w home meds and hold within parameters   - monitor BP   - check orthostatics    Problem/Plan - 4:  ·  Problem: Acute blood loss anemia likely UGIB,  hx of gastric ulcer  - Hgb downtrending 11.6 from 13 on admit  - Monitor CBC  - transfuse for hgb<7 or if HD instability  - on PPI     Problem/Plan - 5 :  ·	Problem: Near syncope.   - likely 2/2 to hypovolemic from melena  - orthostatic vitals negative by criteria but bp low normal asymptomatic  - c/w IVF  - TTE pending    Problem/Plan -6: Problem:- DVT ppx: SCDs given melena        Advanced care planning/advanced directives discussed with patient/family. DNR status including forceful chest compressions to attempt to restart the heart, ventilator support/artificial breathing, electric shock, artificial nutrition, health care proxy, Molst form all discussed with pt. Pt wishes to consider.  More than fifteen minutes spent on discussing advanced directives. OMT on six regions for acute somatic dysfunctions done at the bedside       Curt Barton [facility]   Eddie Lucero DO [facility]  Cardiovascular Medicine  [address], [address]  Office [phone]  Cell [phone]

## 2022-01-17 NOTE — PATIENT PROFILE ADULT - VISION (WITH CORRECTIVE LENSES IF THE PATIENT USUALLY WEARS THEM):
reading only/Normal vision: sees adequately in most situations; can see medication labels, newsprint

## 2022-01-17 NOTE — H&P ADULT - HISTORY OF PRESENT ILLNESS
75 yo M with HTN and depression who presents with near syncope x 2 episodes, x 2 days; no LOC/head strike. EMS noted melena, +rectal. Hb 13. getting 1L, mild PATY. Elevated WBC, no source of infxn, defer abx. Protonix 80, GI emailed in ED.    as per records, family was able to lower the patient to the floor. EMS reported an episode of melena during transfer and while in the ED, the patient had an additional episode of bloody bowel movement.    Vitals: afebrile, HR 81, /61, SpO2 97% on room air.  Labs: leukocytosis with neutrophilic predominance. CMP significant for elevated BUN of 50 and creatinine of 1.31 (GFR 53). There are no prior labs to compare. Type and screen was collected. U/A is negative for UTI.  CXR: clear lungs    ED interventions: protonix 80mg IV x1, sucralfate, LR 1L bolus. 75 yo M with HTN, CAD s/p FRANCO in 2008 (on aspirin), and depression who presents with near syncope. Patient states he typically has some mild dizziness associated with getting from seated positions and related to car motion sickness; however, starting Saturday Jan 15th, the patient experienced worsening dizziness. This first time on Saturday was moderate, occured at rest and resolved on its own. He then developed dizziness the day of presentation, Sunday Jan 16th. This occured at his friend's house while playing dominos. He felt persistent dizziness as well as nausea. He asked his friend to help him to the restroom; however, he could not make it there and vomited on the floor. His sustained no trauma as his friend was able to lower him to the ground. He denies any loss of consciousness, chest pain, or palpitations during this event.     En route to the ED, EMS reported an episode of melena during transfer and while in the ED, the patient had an additional episode of bloody bowel movement.    Of note, the patient has a history of gastric ulcer, diagnosed in 1999 and related to his then history of smoking and alcohol use. He states he was in the hospital for 30 days during this evaluation. Since then he has not used alcohol or smoked cigarettes. He gets regular colonoscopies and endoscopies, last one reportedly 8 months ago and normal. He states he gets them every few years.    Vitals: afebrile, HR 81, /61, SpO2 97% on room air.  Labs: leukocytosis with neutrophilic predominance. CMP significant for elevated BUN of 50 and creatinine of 1.31 (GFR 53). There are no prior labs to compare. Type and screen was collected. U/A is negative for UTI.  CXR: clear lungs    ED interventions: protonix 80mg IV x1, sucralfate, LR 1L bolus. 77 yo M with HTN, CAD s/p FRANCO in 2008 (on aspirin), and known gastric ulcer (diagnosed in 1999) who presents with near syncope. Patient states he typically has some mild dizziness associated with getting from seated positions and related to car motion sickness; however, starting Saturday Jan 15th, the patient experienced worsening dizziness. This first time on Saturday was moderate, occured at rest and resolved on its own. He then developed dizziness the day of presentation, Sunday Jan 16th. This occured at his friend's house while playing dominos. He felt persistent dizziness as well as nausea. He asked his friend to help him to the restroom; however, he could not make it there and vomited on the floor. His sustained no trauma as his friend was able to lower him to the ground. He denies any loss of consciousness, chest pain, or palpitations during this event.     En route to the ED, EMS reported an episode of melena during transfer and while in the ED, the patient had an additional episode of bloody bowel movement.    Of note, the patient has a history of gastric ulcer, diagnosed in 1999 and related to his then history of smoking and alcohol use. He states he was in the hospital for 30 days during this evaluation. Since then he has not used alcohol or smoked cigarettes. He gets regular colonoscopies and endoscopies, last one reportedly 8 months ago and normal. He states he gets them every few years.    Vitals: afebrile, HR 81, /61, SpO2 97% on room air.  Labs: leukocytosis with neutrophilic predominance. CMP significant for elevated BUN of 50 and creatinine of 1.31 (GFR 53). There are no prior labs to compare. Type and screen was collected. U/A is negative for UTI.  CXR: clear lungs    ED interventions: protonix 80mg IV x1, sucralfate, LR 1L bolus.

## 2022-01-17 NOTE — PATIENT PROFILE ADULT - FALL HARM RISK - HARM RISK INTERVENTIONS

## 2022-01-17 NOTE — H&P ADULT - PROBLEM SELECTOR PLAN 1
1 episode of melena en route to us, 1 episode while in the ED  - ED rectal exam noted: scant melena. no brisk bleeding, no large melena BM  - keep active type and screen  - maintain 2 large bore IVs  - s/p protonix 80mg IV x1  - continue protonix 40mg IV daily   - consult GI in the AM for possible endoscopy. NPO for now 1 episode of melena en route to us, 1 episode while in the ED  - ED rectal exam noted: scant melena. no brisk bleeding, no large melena BM  - keep active type and screen  - repeat CBC now  - maintain 2 large bore IVs  - s/p protonix 80mg IV x1  - continue protonix 40mg IV daily   - transfuse for hgb goal >7 or if change in hemodynamics  - consult GI in the AM for possible endoscopy. NPO for now 1 episode of melena en route to us, 1 episode while in the ED  - ED rectal exam noted: scant melena. no brisk bleeding, no large melena BM  - keep active type and screen  - repeat CBC now  - maintain 2 large bore IVs  - s/p protonix 80mg IV x1  - continue protonix 40mg IV daily   - transfuse for hgb goal >7 or if change in hemodynamics  - consult GI in the AM for possible endoscopy-- consult team was emailed overnight  - NPO for now

## 2022-01-17 NOTE — H&P ADULT - NSHPSOCIALHISTORY_GEN_ALL_CORE
Bladder non-tender and non-distended. Urine clear yellow. No EtOH use or smoking history since 1999.

## 2022-01-17 NOTE — H&P ADULT - PROBLEM SELECTOR PLAN 3
- DVT ppx: hold for now as there is concern for melena  - GI ppx: protonix  - Diet: NPO, pending GI evaluation S/p FRANCO in 2008, on ASA 81mg daily at home  - hold aspirin for now  - continue atorvastatin  - pt is on antihypertensives as well but did not know which ones. Please confirm with the patient / pharmacy in the AM

## 2022-01-17 NOTE — H&P ADULT - NSHPLABSRESULTS_GEN_ALL_CORE
LABS:                         12.0   13.93 )-----------( 288      ( 2022 03:18 )             35.1     -    135  |  99  |  50<H>  ----------------------------<  159<H>  4.8   |  23  |  1.31<H>    Ca    9.9      2022 21:16  Phos  2.8       Mg     1.7         TPro  7.1  /  Alb  4.2  /  TBili  0.4  /  DBili  x   /  AST  26  /  ALT  35  /  AlkPhos  52      PT/INR - ( 2022 21:16 )   PT: 13.6 sec;   INR: 1.14 ratio         PTT - ( 2022 21:16 )  PTT:24.8 sec  Urinalysis Basic - ( 2022 00:20 )    Color: Light Yellow / Appearance: Clear / S.025 / pH: x  Gluc: x / Ketone: Small  / Bili: Negative / Urobili: Negative   Blood: x / Protein: Trace / Nitrite: Negative   Leuk Esterase: Negative / RBC: 1 /hpf / WBC 1 /HPF   Sq Epi: x / Non Sq Epi: 0 /hpf / Bacteria: Negative              Records reviewed from prior hospitalization.  Labs reviewed remarkable for - leukocytosis with neutrophilic predominance. CMP significant for elevated BUN of 50 and creatinine of 1.31 (GFR 53). There are no prior labs to compare. Type and screen was collected.  EKG personally reviewed - NSR  CXR personally reviewed - clear lungs

## 2022-01-17 NOTE — PROGRESS NOTE ADULT - PROBLEM SELECTOR PLAN 7
- DVT ppx: SCDs given melena  - GI ppx: protonix      Dispo: pending cards clearance and EGD    discussed with GI team and NAIMA Ferrell

## 2022-01-17 NOTE — PROGRESS NOTE ADULT - ASSESSMENT
77 yo M with HTN, CAD s/p FRANCO in 2008 (on aspirin), and known gastric ulcer (diagnosed in 1999) who presents with near syncope in the setting melena admitted with acute blood loss anemia likely due to UGIB.

## 2022-01-17 NOTE — ED ADULT NURSE NOTE - NSIMPLEMENTINTERV_GEN_ALL_ED
Implemented All Fall Risk Interventions:  [place] to call system. Call bell, personal items and telephone within reach. Instruct patient to call for assistance. Room bathroom lighting operational. Non-slip footwear when patient is off stretcher. Physically safe environment: no spills, clutter or unnecessary equipment. Stretcher in lowest position, wheels locked, appropriate side rails in place. Provide visual cue, wrist band, yellow gown, etc. Monitor gait and stability. Monitor for mental status changes and reorient to person, place, and time. Review medications for side effects contributing to fall risk. Reinforce activity limits and safety measures with patient and family.

## 2022-01-17 NOTE — H&P ADULT - PROBLEM SELECTOR PLAN 4
- DVT ppx: SCDs; hold chemical ppx for now as there is concern for melena  - GI ppx: protonix  - Diet: NPO, pending GI evaluation

## 2022-01-17 NOTE — CONSULT NOTE ADULT - ASSESSMENT
77 yo M with HTN, CAD s/p FRANCO in 2008 (on aspirin), and known gastric ulcer (diagnosed in 1999) who presents with near syncope + melena. En route to the ED, EMS reported an episode of melena during transfer and while in the ED, the patient had an additional episode of bloody bowel movement. Of note, the patient has a history of gastric ulcer, diagnosed in 1999 and related to his then history of smoking and alcohol use. He states he was in the hospital for 30 days during this evaluation. Since then he has not used alcohol or smoked cigarettes. He gets regular colonoscopies and endoscopies, last one reportedly 8 months ago and normal. He states he gets them every few years.      #c/f UGIB  Patient presents with syncope in the setting of melena. Differential diagnosis remains broad and includes malignancy, esophagitis, peptic ulcer disease, H pylori infection, erosive gastropathy, varices, portal hypertensive gastropathy, GAVE, arteriovenous malformation [AVM], Nicole-Koenig tear, Dieulafoy lesion. Hgb slowly trending down from 13 -> 11. O/w HDS. Rectal exam notable for melena.    Recommendations:  -trend vitals, CBC, and monitor for clinical signs of bleeding  -maintain active type and screen  -transfusion goal to maintain hemoglobin >/= 7.0  -rule out other causes for anemia [consider iron studies, ferritin, vitamin B12, folate, LDH, haptoglobin]  -avoid NSAIDs  -PPI IV BID acceptable for now  -needs cardiology eval for endoscopy and syncope workup prior to EGD  -tentative plan for EGD 1/19 if pt has cardiology eval completed and cleared    Maria Ines French MD  GI Fellow, PGY-4  Available via [employer] Teams    NON-URGENT CONSULTS:  Please email [email or web address]  AT NIGHT AND ON WEEKENDS:  Contact on-call GI fellow via answering service ([phone]) from 5pm-8am and on weekends/holidays  MONDAY-FRIDAY 8AM-5PM:  Pager# [phone] ([facility]) or [phone] ([facility])  GI Phone# [phone] ([facility])

## 2022-01-17 NOTE — PROGRESS NOTE ADULT - PROBLEM SELECTOR PLAN 5
S/p FRANCO in 2008, on ASA 81mg daily at home  - no cp, sob  - restart aspirin per GI  - continue atorvastatin  - hold BP meds  - TTE pending  - cards consulted 10 from 16 downtrended, likely stress due to GIB, no s/s of infection  -trend cbc

## 2022-01-17 NOTE — ED ADULT NURSE NOTE - OBJECTIVE STATEMENT
75 yo male with pmh HTN, DM presents to ED by EMS s/p near syncopal episode at home. As per EMS, pt's family was able to lower pt to floor, pt did not fall or hit his head. Pt reports "I was feeling dizzy since yesterday, on and off." EMS reports episode of melena upon transfer. Pt denies cp, sob, palpitations, abdominal pain, n/v/d, prior episodes of melena/dark stool, urinary symptoms, h/a, vision changes, numbness/tingling, loss of sensation, LOC, AC use, fevers, chills, body aches. Upon assessment, pt a&ox3, nad, breathing spontaneous and nonlabored, able to move all extremities and follow commands, skin warm and appropriate color, abdomen soft. Pt NSR on CM. MD at bedside. Pt safety and comfort provided. 77 yo male with pmh HTN presents to ED by EMS s/p near syncopal episode at home. As per EMS, pt's family was able to lower pt to floor, pt did not fall or hit his head. Pt reports "I was feeling dizzy since yesterday, on and off." EMS reports episode of melena upon transfer. Pt denies cp, sob, palpitations, abdominal pain, n/v/d, prior episodes of melena/dark stool, urinary symptoms, h/a, vision changes, numbness/tingling, loss of sensation, LOC, AC use, fevers, chills, body aches. Upon assessment, pt a&ox3, nad, breathing spontaneous and nonlabored, able to move all extremities and follow commands, skin warm and appropriate color, abdomen soft. Pt NSR on CM. MD at bedside. Pt safety and comfort provided.

## 2022-01-17 NOTE — H&P ADULT - NSICDXPASTMEDICALHX_GEN_ALL_CORE_FT
PAST MEDICAL HISTORY:  CAD (coronary artery disease) s/p stent in 2008    Gastric ulcer diagnosed in 1999    HTN (hypertension)

## 2022-01-17 NOTE — CONSULT NOTE ADULT - SUBJECTIVE AND OBJECTIVE BOX
Chief Complaint:  Patient is a 76y old  Male who presents with a chief complaint of Presyncope in the setting of melena (2022 15:56)      HPI: 75 yo M with HTN, CAD s/p FRANCO in  (on aspirin), and known gastric ulcer (diagnosed in ) who presents with near syncope. Patient states he typically has some mild dizziness associated with getting from seated positions and related to car motion sickness; however, starting , the patient experienced worsening dizziness. This first time on Saturday was moderate, occured at rest and resolved on its own. He then developed dizziness the day of presentation, . This occured at his friend's house while playing dominos. He felt persistent dizziness as well as nausea. He asked his friend to help him to the restroom; however, he could not make it there and vomited on the floor. His sustained no trauma as his friend was able to lower him to the ground. He denies any loss of consciousness, chest pain, or palpitations during this event.     En route to the ED, EMS reported an episode of melena during transfer and while in the ED, the patient had an additional episode of bloody bowel movement. Of note, the patient has a history of gastric ulcer, diagnosed in  and related to his then history of smoking and alcohol use. He states he was in the hospital for 30 days during this evaluation. Since then he has not used alcohol or smoked cigarettes. He gets regular colonoscopies and endoscopies, last one reportedly 8 months ago and normal. He states he gets them every few years.      Allergies:  No Known Allergies      Home Medications:    Hospital Medications:  acetaminophen     Tablet .. 650 milliGRAM(s) Oral every 6 hours PRN  aluminum hydroxide/magnesium hydroxide/simethicone Suspension 30 milliLiter(s) Oral every 4 hours PRN  atorvastatin 40 milliGRAM(s) Oral at bedtime  melatonin 3 milliGRAM(s) Oral at bedtime PRN  ondansetron Injectable 4 milliGRAM(s) IV Push every 8 hours PRN  pantoprazole  Injectable 40 milliGRAM(s) IV Push every 12 hours  sodium chloride 0.9%. 1000 milliLiter(s) IV Continuous <Continuous>      PMHX/PSHX:  HTN (hypertension)    CAD (coronary artery disease)    Gastric ulcer    No significant past surgical history        Family history:  FH: diabetes mellitus (Mother)     Denies any family history of GI-related disease or cancers.    Social History:   ETOH: denies  Tobacco: denies  Illicit drug use: denies    ROS: 14 point ROS negative unless otherwise stated in HPI      Vital Signs:  Vital Signs Last 24 Hrs  T(C): 37.1 (2022 21:40), Max: 37.1 (2022 21:40)  T(F): 98.8 (2022 21:40), Max: 98.8 (2022 21:40)  HR: 82 (2022 21:40) (72 - 82)  BP: 111/75 (2022 21:40) (105/63 - 120/77)  BP(mean): --  RR: 18 (2022 21:40) (15 - 18)  SpO2: 94% (2022 21:40) (94% - 98%)  Daily     Daily     PHYSICAL EXAM:     GENERAL:  Appears stated age, well-groomed, well-nourished, no distress  HEENT:  NC/AT,  conjunctivae clear and pink  CHEST:  Full & symmetric excursion, no increased effort, breath sounds clear  HEART:  Regular rhythm, S1, S2, no murmur/rub/S3/S4  ABDOMEN:  Soft, non-tender, non-distended, normoactive bowel sounds,    RECTAL: +melena, no hematochezia  EXTREMITIES:  no cyanosis,clubbing or edema  SKIN:  No rash/erythema/ecchymoses/petechiae/wounds/abscess/warm/dry  NEURO:  Alert, orientedx3      LABS:                        10.9   7.07  )-----------( 272      ( 2022 20:16 )             31.9     -17    136  |  101  |  40<H>  ----------------------------<  111<H>  3.5   |  21<L>  |  0.88    Ca    9.5      2022 07:14  Phos  4.0       Mg     1.7         TPro  7.1  /  Alb  4.2  /  TBili  0.4  /  DBili  x   /  AST  26  /  ALT  35  /  AlkPhos  52  -16    LIVER FUNCTIONS - ( 2022 21:16 )  Alb: 4.2 g/dL / Pro: 7.1 g/dL / ALK PHOS: 52 U/L / ALT: 35 U/L / AST: 26 U/L / GGT: x           PT/INR - ( 2022 21:16 )   PT: 13.6 sec;   INR: 1.14 ratio         PTT - ( 2022 21:16 )  PTT:24.8 sec  Urinalysis Basic - ( 2022 00:20 )    Color: Light Yellow / Appearance: Clear / S.025 / pH: x  Gluc: x / Ketone: Small  / Bili: Negative / Urobili: Negative   Blood: x / Protein: Trace / Nitrite: Negative   Leuk Esterase: Negative / RBC: 1 /hpf / WBC 1 /HPF   Sq Epi: x / Non Sq Epi: 0 /hpf / Bacteria: Negative          Imaging: No new abdominal imaging

## 2022-01-18 LAB
ANION GAP SERPL CALC-SCNC: 11 MMOL/L — SIGNIFICANT CHANGE UP (ref 5–17)
BUN SERPL-MCNC: 30 MG/DL — HIGH (ref 7–23)
CALCIUM SERPL-MCNC: 8.6 MG/DL — SIGNIFICANT CHANGE UP (ref 8.4–10.5)
CHLORIDE SERPL-SCNC: 102 MMOL/L — SIGNIFICANT CHANGE UP (ref 96–108)
CO2 SERPL-SCNC: 23 MMOL/L — SIGNIFICANT CHANGE UP (ref 22–31)
CREAT SERPL-MCNC: 0.8 MG/DL — SIGNIFICANT CHANGE UP (ref 0.5–1.3)
CULTURE RESULTS: SIGNIFICANT CHANGE UP
GLUCOSE SERPL-MCNC: 116 MG/DL — HIGH (ref 70–99)
HCT VFR BLD CALC: 26.9 % — LOW (ref 39–50)
HCT VFR BLD CALC: 28.2 % — LOW (ref 39–50)
HCT VFR BLD CALC: 28.3 % — LOW (ref 39–50)
HCT VFR BLD CALC: 29.4 % — LOW (ref 39–50)
HCT VFR BLD CALC: 31 % — LOW (ref 39–50)
HCV AB S/CO SERPL IA: 0.16 S/CO — SIGNIFICANT CHANGE UP (ref 0–0.99)
HCV AB SERPL-IMP: SIGNIFICANT CHANGE UP
HGB BLD-MCNC: 10.2 G/DL — LOW (ref 13–17)
HGB BLD-MCNC: 10.4 G/DL — LOW (ref 13–17)
HGB BLD-MCNC: 9.1 G/DL — LOW (ref 13–17)
HGB BLD-MCNC: 9.6 G/DL — LOW (ref 13–17)
HGB BLD-MCNC: 9.7 G/DL — LOW (ref 13–17)
MCHC RBC-ENTMCNC: 31.7 PG — SIGNIFICANT CHANGE UP (ref 27–34)
MCHC RBC-ENTMCNC: 31.9 PG — SIGNIFICANT CHANGE UP (ref 27–34)
MCHC RBC-ENTMCNC: 32.4 PG — SIGNIFICANT CHANGE UP (ref 27–34)
MCHC RBC-ENTMCNC: 32.4 PG — SIGNIFICANT CHANGE UP (ref 27–34)
MCHC RBC-ENTMCNC: 32.8 PG — SIGNIFICANT CHANGE UP (ref 27–34)
MCHC RBC-ENTMCNC: 33.5 GM/DL — SIGNIFICANT CHANGE UP (ref 32–36)
MCHC RBC-ENTMCNC: 33.8 GM/DL — SIGNIFICANT CHANGE UP (ref 32–36)
MCHC RBC-ENTMCNC: 34 GM/DL — SIGNIFICANT CHANGE UP (ref 32–36)
MCHC RBC-ENTMCNC: 34.3 GM/DL — SIGNIFICANT CHANGE UP (ref 32–36)
MCHC RBC-ENTMCNC: 34.7 GM/DL — SIGNIFICANT CHANGE UP (ref 32–36)
MCV RBC AUTO: 94.4 FL — SIGNIFICANT CHANGE UP (ref 80–100)
MCV RBC AUTO: 94.5 FL — SIGNIFICANT CHANGE UP (ref 80–100)
MCV RBC AUTO: 94.5 FL — SIGNIFICANT CHANGE UP (ref 80–100)
MCV RBC AUTO: 94.6 FL — SIGNIFICANT CHANGE UP (ref 80–100)
MCV RBC AUTO: 95.3 FL — SIGNIFICANT CHANGE UP (ref 80–100)
NRBC # BLD: 0 /100 WBCS — SIGNIFICANT CHANGE UP (ref 0–0)
PLATELET # BLD AUTO: 249 K/UL — SIGNIFICANT CHANGE UP (ref 150–400)
PLATELET # BLD AUTO: 259 K/UL — SIGNIFICANT CHANGE UP (ref 150–400)
PLATELET # BLD AUTO: 265 K/UL — SIGNIFICANT CHANGE UP (ref 150–400)
PLATELET # BLD AUTO: 268 K/UL — SIGNIFICANT CHANGE UP (ref 150–400)
PLATELET # BLD AUTO: 271 K/UL — SIGNIFICANT CHANGE UP (ref 150–400)
POTASSIUM SERPL-MCNC: 4.1 MMOL/L — SIGNIFICANT CHANGE UP (ref 3.5–5.3)
POTASSIUM SERPL-SCNC: 4.1 MMOL/L — SIGNIFICANT CHANGE UP (ref 3.5–5.3)
RBC # BLD: 2.85 M/UL — LOW (ref 4.2–5.8)
RBC # BLD: 2.96 M/UL — LOW (ref 4.2–5.8)
RBC # BLD: 2.99 M/UL — LOW (ref 4.2–5.8)
RBC # BLD: 3.11 M/UL — LOW (ref 4.2–5.8)
RBC # BLD: 3.28 M/UL — LOW (ref 4.2–5.8)
RBC # FLD: 11.8 % — SIGNIFICANT CHANGE UP (ref 10.3–14.5)
RBC # FLD: 11.9 % — SIGNIFICANT CHANGE UP (ref 10.3–14.5)
RBC # FLD: 11.9 % — SIGNIFICANT CHANGE UP (ref 10.3–14.5)
SODIUM SERPL-SCNC: 136 MMOL/L — SIGNIFICANT CHANGE UP (ref 135–145)
SPECIMEN SOURCE: SIGNIFICANT CHANGE UP
WBC # BLD: 5.94 K/UL — SIGNIFICANT CHANGE UP (ref 3.8–10.5)
WBC # BLD: 6.59 K/UL — SIGNIFICANT CHANGE UP (ref 3.8–10.5)
WBC # BLD: 7.96 K/UL — SIGNIFICANT CHANGE UP (ref 3.8–10.5)
WBC # BLD: 8.25 K/UL — SIGNIFICANT CHANGE UP (ref 3.8–10.5)
WBC # BLD: 9.29 K/UL — SIGNIFICANT CHANGE UP (ref 3.8–10.5)
WBC # FLD AUTO: 5.94 K/UL — SIGNIFICANT CHANGE UP (ref 3.8–10.5)
WBC # FLD AUTO: 6.59 K/UL — SIGNIFICANT CHANGE UP (ref 3.8–10.5)
WBC # FLD AUTO: 7.96 K/UL — SIGNIFICANT CHANGE UP (ref 3.8–10.5)
WBC # FLD AUTO: 8.25 K/UL — SIGNIFICANT CHANGE UP (ref 3.8–10.5)
WBC # FLD AUTO: 9.29 K/UL — SIGNIFICANT CHANGE UP (ref 3.8–10.5)

## 2022-01-18 PROCEDURE — 93306 TTE W/DOPPLER COMPLETE: CPT | Mod: 26

## 2022-01-18 PROCEDURE — 99233 SBSQ HOSP IP/OBS HIGH 50: CPT

## 2022-01-18 PROCEDURE — 99222 1ST HOSP IP/OBS MODERATE 55: CPT | Mod: GC

## 2022-01-18 RX ADMIN — PANTOPRAZOLE SODIUM 40 MILLIGRAM(S): 20 TABLET, DELAYED RELEASE ORAL at 17:54

## 2022-01-18 RX ADMIN — ATORVASTATIN CALCIUM 40 MILLIGRAM(S): 80 TABLET, FILM COATED ORAL at 21:33

## 2022-01-18 RX ADMIN — SODIUM CHLORIDE 75 MILLILITER(S): 9 INJECTION INTRAMUSCULAR; INTRAVENOUS; SUBCUTANEOUS at 12:12

## 2022-01-18 RX ADMIN — PANTOPRAZOLE SODIUM 40 MILLIGRAM(S): 20 TABLET, DELAYED RELEASE ORAL at 06:02

## 2022-01-18 RX ADMIN — Medication 81 MILLIGRAM(S): at 12:11

## 2022-01-19 LAB
ANION GAP SERPL CALC-SCNC: 12 MMOL/L — SIGNIFICANT CHANGE UP (ref 5–17)
BLD GP AB SCN SERPL QL: NEGATIVE — SIGNIFICANT CHANGE UP
BUN SERPL-MCNC: 24 MG/DL — HIGH (ref 7–23)
CALCIUM SERPL-MCNC: 8.5 MG/DL — SIGNIFICANT CHANGE UP (ref 8.4–10.5)
CHLORIDE SERPL-SCNC: 105 MMOL/L — SIGNIFICANT CHANGE UP (ref 96–108)
CO2 SERPL-SCNC: 22 MMOL/L — SIGNIFICANT CHANGE UP (ref 22–31)
CREAT SERPL-MCNC: 0.84 MG/DL — SIGNIFICANT CHANGE UP (ref 0.5–1.3)
GLUCOSE SERPL-MCNC: 118 MG/DL — HIGH (ref 70–99)
HCT VFR BLD CALC: 19.6 % — CRITICAL LOW (ref 39–50)
HCT VFR BLD CALC: 23.7 % — LOW (ref 39–50)
HGB BLD-MCNC: 6.6 G/DL — CRITICAL LOW (ref 13–17)
HGB BLD-MCNC: 8.1 G/DL — LOW (ref 13–17)
MCHC RBC-ENTMCNC: 32.8 PG — SIGNIFICANT CHANGE UP (ref 27–34)
MCHC RBC-ENTMCNC: 32.9 PG — SIGNIFICANT CHANGE UP (ref 27–34)
MCHC RBC-ENTMCNC: 33.7 GM/DL — SIGNIFICANT CHANGE UP (ref 32–36)
MCHC RBC-ENTMCNC: 34.2 GM/DL — SIGNIFICANT CHANGE UP (ref 32–36)
MCV RBC AUTO: 96.3 FL — SIGNIFICANT CHANGE UP (ref 80–100)
MCV RBC AUTO: 97.5 FL — SIGNIFICANT CHANGE UP (ref 80–100)
NRBC # BLD: 0 /100 WBCS — SIGNIFICANT CHANGE UP (ref 0–0)
NRBC # BLD: 0 /100 WBCS — SIGNIFICANT CHANGE UP (ref 0–0)
PLATELET # BLD AUTO: 189 K/UL — SIGNIFICANT CHANGE UP (ref 150–400)
PLATELET # BLD AUTO: 249 K/UL — SIGNIFICANT CHANGE UP (ref 150–400)
POTASSIUM SERPL-MCNC: 3.7 MMOL/L — SIGNIFICANT CHANGE UP (ref 3.5–5.3)
POTASSIUM SERPL-SCNC: 3.7 MMOL/L — SIGNIFICANT CHANGE UP (ref 3.5–5.3)
RBC # BLD: 2.01 M/UL — LOW (ref 4.2–5.8)
RBC # BLD: 2.46 M/UL — LOW (ref 4.2–5.8)
RBC # FLD: 12 % — SIGNIFICANT CHANGE UP (ref 10.3–14.5)
RBC # FLD: 12 % — SIGNIFICANT CHANGE UP (ref 10.3–14.5)
RH IG SCN BLD-IMP: POSITIVE — SIGNIFICANT CHANGE UP
SARS-COV-2 RNA SPEC QL NAA+PROBE: SIGNIFICANT CHANGE UP
SODIUM SERPL-SCNC: 139 MMOL/L — SIGNIFICANT CHANGE UP (ref 135–145)
WBC # BLD: 5.47 K/UL — SIGNIFICANT CHANGE UP (ref 3.8–10.5)
WBC # BLD: 6.25 K/UL — SIGNIFICANT CHANGE UP (ref 3.8–10.5)
WBC # FLD AUTO: 5.47 K/UL — SIGNIFICANT CHANGE UP (ref 3.8–10.5)
WBC # FLD AUTO: 6.25 K/UL — SIGNIFICANT CHANGE UP (ref 3.8–10.5)

## 2022-01-19 PROCEDURE — 88305 TISSUE EXAM BY PATHOLOGIST: CPT | Mod: 26

## 2022-01-19 PROCEDURE — 43255 EGD CONTROL BLEEDING ANY: CPT | Mod: GC

## 2022-01-19 PROCEDURE — 99233 SBSQ HOSP IP/OBS HIGH 50: CPT

## 2022-01-19 DEVICE — CLIP RESOLUTION 360 235CM: Type: IMPLANTABLE DEVICE | Status: FUNCTIONAL

## 2022-01-19 RX ORDER — PANTOPRAZOLE SODIUM 20 MG/1
8 TABLET, DELAYED RELEASE ORAL
Qty: 80 | Refills: 0 | Status: DISCONTINUED | OUTPATIENT
Start: 2022-01-19 | End: 2022-01-22

## 2022-01-19 RX ORDER — SUCRALFATE 1 G
1 TABLET ORAL
Refills: 0 | Status: DISCONTINUED | OUTPATIENT
Start: 2022-01-19 | End: 2022-01-22

## 2022-01-19 RX ORDER — ASPIRIN/CALCIUM CARB/MAGNESIUM 324 MG
81 TABLET ORAL DAILY
Refills: 0 | Status: DISCONTINUED | OUTPATIENT
Start: 2022-01-19 | End: 2022-01-22

## 2022-01-19 RX ADMIN — Medication 1 GRAM(S): at 23:24

## 2022-01-19 RX ADMIN — Medication 81 MILLIGRAM(S): at 15:27

## 2022-01-19 RX ADMIN — PANTOPRAZOLE SODIUM 10 MG/HR: 20 TABLET, DELAYED RELEASE ORAL at 15:27

## 2022-01-19 RX ADMIN — PANTOPRAZOLE SODIUM 40 MILLIGRAM(S): 20 TABLET, DELAYED RELEASE ORAL at 05:59

## 2022-01-19 RX ADMIN — SODIUM CHLORIDE 75 MILLILITER(S): 9 INJECTION INTRAMUSCULAR; INTRAVENOUS; SUBCUTANEOUS at 07:43

## 2022-01-19 RX ADMIN — Medication 1 GRAM(S): at 17:51

## 2022-01-19 RX ADMIN — ATORVASTATIN CALCIUM 40 MILLIGRAM(S): 80 TABLET, FILM COATED ORAL at 21:45

## 2022-01-19 NOTE — PROGRESS NOTE ADULT - ASSESSMENT
75 yo M with HTN, CAD s/p FRANCO in 2008 (on aspirin), and known gastric ulcer (diagnosed in 1999) who presents with near syncope in the setting melena admitted with acute blood loss anemia likely due to UGIB.

## 2022-01-19 NOTE — PRE PROCEDURE NOTE - PRE PROCEDURE EVALUATION
Attending Physician:   Dr. Wolf                         Procedure:   EGD    Indication for Procedure:   Melena  ________________________________________________________  PAST MEDICAL & SURGICAL HISTORY:    HTN (hypertension)  CAD (coronary artery disease) s/p stent in 2008  Gastric ulcer-diagnosed in 1999    No significant past surgical history      ALLERGIES:  No Known Allergies    HOME MEDICATIONS:  aspirin 81 mg oral delayed release tablet: 1 tab(s) orally once a day  atorvastatin 20 mg oral tablet: 1 tab(s) orally once a day    Note: Pt has not started yet.  brimonidine 0.15% ophthalmic solution: 1 drop(s) to each affected eye 2 times a day  metFORMIN 500 mg oral tablet: 1 tab(s) orally once a day  pantoprazole 40 mg oral delayed release tablet: 1 tab(s) orally once a day  Restasis 0.05% ophthalmic emulsion: 1 drop(s) to each affected eye every 12 hours  timolol maleate 0.5% ophthalmic solution: 1 drop(s) to each affected eye 2 times a day  valsartan 80 mg oral tablet: 1 tab(s) orally once a day    AICD/PPM: [ ] yes   [X ] no    PERTINENT LAB DATA:                        8.1    6.25  )-----------( 249      ( 19 Jan 2022 07:26 )             23.7     01-19    139  |  105  |  24<H>  ----------------------------<  118<H>  3.7   |  22  |  0.84    Ca    8.5      19 Jan 2022 07:24    PHYSICAL EXAMINATION:    T(C): 36.9  HR: 95  BP: 101/62  RR: 17  SpO2: 97%    Constitutional: NAD    Neck:  No JVD  Respiratory: CTAB/L  Cardiovascular: S1 and S2  Gastrointestinal: BS+, soft, NT/ND  Extremities: No peripheral edema  Neurological: A/O x 4      COMMENTS:    The patient is a suitable candidate for the planned procedure unless box checked [ ]  No, explain:

## 2022-01-19 NOTE — PROVIDER CONTACT NOTE (CRITICAL VALUE NOTIFICATION) - BACKGROUND
pt A&Ox4; primarily Citizen of the [place] speaking. pt Admitted with UGIB, syncope 2/2 melena

## 2022-01-20 LAB
ANION GAP SERPL CALC-SCNC: 12 MMOL/L — SIGNIFICANT CHANGE UP (ref 5–17)
BUN SERPL-MCNC: 11 MG/DL — SIGNIFICANT CHANGE UP (ref 7–23)
CALCIUM SERPL-MCNC: 8.1 MG/DL — LOW (ref 8.4–10.5)
CHLORIDE SERPL-SCNC: 106 MMOL/L — SIGNIFICANT CHANGE UP (ref 96–108)
CO2 SERPL-SCNC: 20 MMOL/L — LOW (ref 22–31)
CREAT SERPL-MCNC: 0.76 MG/DL — SIGNIFICANT CHANGE UP (ref 0.5–1.3)
GLUCOSE SERPL-MCNC: 108 MG/DL — HIGH (ref 70–99)
HCT VFR BLD CALC: 24.9 % — LOW (ref 39–50)
HCT VFR BLD CALC: 25 % — LOW (ref 39–50)
HCT VFR BLD CALC: 25 % — LOW (ref 39–50)
HCT VFR BLD CALC: 25.7 % — LOW (ref 39–50)
HGB BLD-MCNC: 8.5 G/DL — LOW (ref 13–17)
HGB BLD-MCNC: 8.6 G/DL — LOW (ref 13–17)
HGB BLD-MCNC: 8.6 G/DL — LOW (ref 13–17)
HGB BLD-MCNC: 8.8 G/DL — LOW (ref 13–17)
MCHC RBC-ENTMCNC: 31.8 PG — SIGNIFICANT CHANGE UP (ref 27–34)
MCHC RBC-ENTMCNC: 31.9 PG — SIGNIFICANT CHANGE UP (ref 27–34)
MCHC RBC-ENTMCNC: 32 PG — SIGNIFICANT CHANGE UP (ref 27–34)
MCHC RBC-ENTMCNC: 32.2 PG — SIGNIFICANT CHANGE UP (ref 27–34)
MCHC RBC-ENTMCNC: 34 GM/DL — SIGNIFICANT CHANGE UP (ref 32–36)
MCHC RBC-ENTMCNC: 34.2 GM/DL — SIGNIFICANT CHANGE UP (ref 32–36)
MCHC RBC-ENTMCNC: 34.4 GM/DL — SIGNIFICANT CHANGE UP (ref 32–36)
MCHC RBC-ENTMCNC: 34.5 GM/DL — SIGNIFICANT CHANGE UP (ref 32–36)
MCV RBC AUTO: 92.6 FL — SIGNIFICANT CHANGE UP (ref 80–100)
MCV RBC AUTO: 93.1 FL — SIGNIFICANT CHANGE UP (ref 80–100)
MCV RBC AUTO: 93.6 FL — SIGNIFICANT CHANGE UP (ref 80–100)
MCV RBC AUTO: 93.6 FL — SIGNIFICANT CHANGE UP (ref 80–100)
NRBC # BLD: 0 /100 WBCS — SIGNIFICANT CHANGE UP (ref 0–0)
PLATELET # BLD AUTO: 228 K/UL — SIGNIFICANT CHANGE UP (ref 150–400)
PLATELET # BLD AUTO: 238 K/UL — SIGNIFICANT CHANGE UP (ref 150–400)
PLATELET # BLD AUTO: 241 K/UL — SIGNIFICANT CHANGE UP (ref 150–400)
PLATELET # BLD AUTO: 247 K/UL — SIGNIFICANT CHANGE UP (ref 150–400)
POTASSIUM SERPL-MCNC: 3.5 MMOL/L — SIGNIFICANT CHANGE UP (ref 3.5–5.3)
POTASSIUM SERPL-SCNC: 3.5 MMOL/L — SIGNIFICANT CHANGE UP (ref 3.5–5.3)
RBC # BLD: 2.67 M/UL — LOW (ref 4.2–5.8)
RBC # BLD: 2.67 M/UL — LOW (ref 4.2–5.8)
RBC # BLD: 2.69 M/UL — LOW (ref 4.2–5.8)
RBC # BLD: 2.76 M/UL — LOW (ref 4.2–5.8)
RBC # FLD: 13.2 % — SIGNIFICANT CHANGE UP (ref 10.3–14.5)
RBC # FLD: 13.5 % — SIGNIFICANT CHANGE UP (ref 10.3–14.5)
RBC # FLD: 13.6 % — SIGNIFICANT CHANGE UP (ref 10.3–14.5)
RBC # FLD: 13.7 % — SIGNIFICANT CHANGE UP (ref 10.3–14.5)
SODIUM SERPL-SCNC: 138 MMOL/L — SIGNIFICANT CHANGE UP (ref 135–145)
WBC # BLD: 6.05 K/UL — SIGNIFICANT CHANGE UP (ref 3.8–10.5)
WBC # BLD: 6.31 K/UL — SIGNIFICANT CHANGE UP (ref 3.8–10.5)
WBC # BLD: 6.46 K/UL — SIGNIFICANT CHANGE UP (ref 3.8–10.5)
WBC # BLD: 6.96 K/UL — SIGNIFICANT CHANGE UP (ref 3.8–10.5)
WBC # FLD AUTO: 6.05 K/UL — SIGNIFICANT CHANGE UP (ref 3.8–10.5)
WBC # FLD AUTO: 6.31 K/UL — SIGNIFICANT CHANGE UP (ref 3.8–10.5)
WBC # FLD AUTO: 6.46 K/UL — SIGNIFICANT CHANGE UP (ref 3.8–10.5)
WBC # FLD AUTO: 6.96 K/UL — SIGNIFICANT CHANGE UP (ref 3.8–10.5)

## 2022-01-20 PROCEDURE — 99232 SBSQ HOSP IP/OBS MODERATE 35: CPT | Mod: GC

## 2022-01-20 PROCEDURE — 99233 SBSQ HOSP IP/OBS HIGH 50: CPT

## 2022-01-20 RX ADMIN — Medication 1 GRAM(S): at 05:33

## 2022-01-20 RX ADMIN — Medication 1 GRAM(S): at 17:36

## 2022-01-20 RX ADMIN — SODIUM CHLORIDE 75 MILLILITER(S): 9 INJECTION INTRAMUSCULAR; INTRAVENOUS; SUBCUTANEOUS at 07:47

## 2022-01-20 RX ADMIN — PANTOPRAZOLE SODIUM 10 MG/HR: 20 TABLET, DELAYED RELEASE ORAL at 21:17

## 2022-01-20 RX ADMIN — PANTOPRAZOLE SODIUM 10 MG/HR: 20 TABLET, DELAYED RELEASE ORAL at 07:47

## 2022-01-20 RX ADMIN — Medication 1 GRAM(S): at 21:17

## 2022-01-20 RX ADMIN — Medication 1 GRAM(S): at 11:57

## 2022-01-20 RX ADMIN — SODIUM CHLORIDE 75 MILLILITER(S): 9 INJECTION INTRAMUSCULAR; INTRAVENOUS; SUBCUTANEOUS at 21:17

## 2022-01-20 RX ADMIN — Medication 81 MILLIGRAM(S): at 11:57

## 2022-01-20 RX ADMIN — ATORVASTATIN CALCIUM 40 MILLIGRAM(S): 80 TABLET, FILM COATED ORAL at 21:17

## 2022-01-20 NOTE — DIETITIAN INITIAL EVALUATION ADULT. - REASON
Nutrition-focused physical examination deferred at this time - pt with stable wt hx, reporting good PO intake PTA. No overt signs of fat/muscle wasting visually observed.

## 2022-01-20 NOTE — PROGRESS NOTE ADULT - ASSESSMENT
75 yo M with HTN, CAD s/p FRANCO in 2008 (on aspirin), and known gastric ulcer (diagnosed in 1999) who presents with near syncope + melena. En route to the ED, EMS reported an episode of melena during transfer and while in the ED, the patient had an additional episode of bloody bowel movement. Of note, the patient has a history of gastric ulcer, diagnosed in 1999 and related to his then history of smoking and alcohol use. He states he was in the hospital for 30 days during this evaluation. Since then he has not used alcohol or smoked cigarettes. He gets regular colonoscopies and endoscopies, last one reportedly 8 months ago and normal. He states he gets them every few years.      #UGIB 2/2 dudodenal ulcer  Patient presents with syncope in the setting of melena. Hgb trending down from 13 -> 8. O/w HDS. Rectal exam notable for melena. s/p EGD 1/19 showing duodenal ulcer Davin Class 1b with oozing s/p clip x2. Hgb dropped 1/19 8.1 -> 6.6 -> s/p 1 u pRBC with Hgb 8.6 x 2 on 1/20.    Recommendations:  - trend vitals, CBC, and monitor for clinical signs of bleeding  - maintain active type and screen  - transfusion goal to maintain hemoglobin >/= 7.0  - avoid NSAIDs  - IV PPI gtt x 3 days (1/19-1/21), then can transition to IV PPI 40 mg BID until discharged.  - Patient should be on Protonix (pantoprazole) 40 mg BID for 8 weeks.  - Await pathology results.  - Use sucralfate tablets 1 gram PO QID for 2 weeks.  - OK to resume ASA 81 mg daily for CAD (s/p PCI). Would recommend PPI 40 mg daily while on ASA.        Maria Ines French MD  GI Fellow, PGY-4  Available via [employer] Teams    NON-URGENT CONSULTS:  Please email [email or web address]  [email or web address]  AT NIGHT AND ON WEEKENDS:  Contact on-call GI fellow via answering service ([phone]) from 5pm-8am and on weekends/holidays  MONDAY-FRIDAY 8AM-5PM:  Pager# [phone] ([facility]) or [phone] ([facility])  GI Phone# [phone] ([facility])

## 2022-01-20 NOTE — DIETITIAN INITIAL EVALUATION ADULT. - ORAL NUTRITION SUPPLEMENTS
Recommend multivitamin, pending no medical contraindications, to assist pt in meeting nutrient needs

## 2022-01-20 NOTE — PHYSICAL THERAPY INITIAL EVALUATION ADULT - ADDITIONAL COMMENTS
Pt lives alone in a 4th floor apartment with 4 PHIL, no elevator. Pt was amb (I) without an AD and (I) with all ADLs PTA. Pt reports friend lives nearby and is able to help if needed.

## 2022-01-20 NOTE — DIETITIAN INITIAL EVALUATION ADULT. - CHIEF COMPLAINT
"77 yo M with HTN, CAD s/p FRANCO in 2008 (on aspirin), and known gastric ulcer (diagnosed in 1999) who presents with near syncope in the setting melena admitted with acute blood loss anemia likely due to UGIB. "

## 2022-01-20 NOTE — DIETITIAN INITIAL EVALUATION ADULT. - ORAL INTAKE PTA/DIET HISTORY
Pt interviewed at bedside with  - ID# [id number]. Reports good appetite prior to onset of symptoms, was eating "cat-like food" just PTA, not following any therapeutic diet. Reports he hasn't drank alcohol in >20 years. Confirms NKFA. Pt denies use of dietary supplements/micronutrients. Pt denies history of chewing/swallowing difficulty.

## 2022-01-20 NOTE — PHYSICAL THERAPY INITIAL EVALUATION ADULT - PRECAUTIONS/LIMITATIONS, REHAB EVAL
TTE (1/18): inc'd relative wall thickness with normal L ventricular mass index, consistent with concentric L ventricular remodeling. L ventricular EF appears grossly preserved./fall precautions TTE (1/18): inc'd relative wall thickness with normal L ventricular mass index, consistent with concentric L ventricular remodeling. L ventricular EF appears grossly preserved. Hospital course: (1/19): s/p EGD +multiple non-bleeding duodenal ulcers, +s/p 1 unit PRBC./fall precautions

## 2022-01-20 NOTE — DIETITIAN INITIAL EVALUATION ADULT. - PERTINENT MEDS FT
MEDICATIONS  (STANDING):  aspirin enteric coated 81 milliGRAM(s) Oral daily  atorvastatin 40 milliGRAM(s) Oral at bedtime  pantoprazole Infusion 8 mG/Hr (10 mL/Hr) IV Continuous <Continuous>  sodium chloride 0.9%. 1000 milliLiter(s) (75 mL/Hr) IV Continuous <Continuous>  sucralfate 1 Gram(s) Oral four times a day    MEDICATIONS  (PRN):  acetaminophen     Tablet .. 650 milliGRAM(s) Oral every 6 hours PRN Temp greater or equal to 38C (100.4F), Mild Pain (1 - 3)  aluminum hydroxide/magnesium hydroxide/simethicone Suspension 30 milliLiter(s) Oral every 4 hours PRN Dyspepsia  melatonin 3 milliGRAM(s) Oral at bedtime PRN Insomnia  ondansetron Injectable 4 milliGRAM(s) IV Push every 8 hours PRN Nausea and/or Vomiting

## 2022-01-20 NOTE — DIETITIAN INITIAL EVALUATION ADULT. - ADD RECOMMEND
1) Recommend advancing diet to Full Liquid then Regular as soon as medically feasible to ensure adequate nutrient intake. Defer texture to team.         2) Recommend Ensure Clear 2x/day, and multivitamin, pending no medical contraindications, to assist pt in meeting nutrient needs.              3) Monitor PO intake, GI tolerance, skin integrity, labs, weight.         4) Honor food preferences as feasible, and encourage PO intake as able.        5) RD remains available upon request and will follow-up per protocol.

## 2022-01-20 NOTE — DIETITIAN INITIAL EVALUATION ADULT. - PROBLEM SELECTOR PLAN 3
S/p FRANCO in 2008, on ASA 81mg daily at home  - hold aspirin for now  - continue atorvastatin  - pt is on antihypertensives as well but did not know which ones. Please confirm with the patient / pharmacy in the AM

## 2022-01-20 NOTE — DIETITIAN INITIAL EVALUATION ADULT. - PROBLEM SELECTOR PLAN 1
1 episode of melena en route to us, 1 episode while in the ED  - ED rectal exam noted: scant melena. no brisk bleeding, no large melena BM  - keep active type and screen  - repeat CBC now  - maintain 2 large bore IVs  - s/p protonix 80mg IV x1  - continue protonix 40mg IV daily   - transfuse for hgb goal >7 or if change in hemodynamics  - consult GI in the AM for possible endoscopy-- consult team was emailed overnight  - NPO for now

## 2022-01-20 NOTE — DIETITIAN INITIAL EVALUATION ADULT. - OTHER INFO
Pt reports good appetite with good PO intake in-house. Observed having apple juice and icee at time of visit.   Diet order history:  1/17 & 1/20: Clear liquid diet.   1/18 & 1/19 NPO.   Per chart": "s/p EGD (1/19) showing active duodenal ulcer bleed and other nonbleeding ulcers "    Denies nausea, vomiting, constipation, diarrhea. Denies abdominal pain, reports is feeling well. Reports last BM 1/20. Pt not currently on bowel regimen.     Reports -210 lbs. Denies recent wt changes.   Dosing wt: 210 lbs consistent with UBW.   No additional weights available per chart. RD to continue to monitor weight trends as able/available.     Pt made aware RD remains available.

## 2022-01-20 NOTE — PHYSICAL THERAPY INITIAL EVALUATION ADULT - PERTINENT HX OF CURRENT PROBLEM, REHAB EVAL
Pt is 77 y/o M with HTN, CAD s/p FRANCO in 2008 (on aspirin), and known gastric ulcer (diagnosed in 1999) who presents with near syncope in the setting melena a/w acute blood loss anemia likely due to UGIB. Pt states he typically has mild dizziness a/w from seated positions & car motion. En route to ED, EMS reported an episode of melena during transfer and while in the ED, the pt had +episode of bloody bowel movement. ECG: NSR @80 bpm.

## 2022-01-20 NOTE — DIETITIAN INITIAL EVALUATION ADULT. - PERSON TAUGHT/METHOD
Discussed nutrition therapy for gastrointestinal ulcers. Recommended limiting caffeinated beverages/foods, coffee, tea, chocolate, alcohol, pepper, and spicy foods. Encouraged adequate PO intake of meals to ensure adequate nutrient intake. Pt had no questions at this time./verbal instruction/patient instructed

## 2022-01-21 LAB
HCT VFR BLD CALC: 25.6 % — LOW (ref 39–50)
HGB BLD-MCNC: 8.7 G/DL — LOW (ref 13–17)
MCHC RBC-ENTMCNC: 32.3 PG — SIGNIFICANT CHANGE UP (ref 27–34)
MCHC RBC-ENTMCNC: 34 GM/DL — SIGNIFICANT CHANGE UP (ref 32–36)
MCV RBC AUTO: 95.2 FL — SIGNIFICANT CHANGE UP (ref 80–100)
NRBC # BLD: 0 /100 WBCS — SIGNIFICANT CHANGE UP (ref 0–0)
PLATELET # BLD AUTO: 260 K/UL — SIGNIFICANT CHANGE UP (ref 150–400)
RBC # BLD: 2.69 M/UL — LOW (ref 4.2–5.8)
RBC # FLD: 13.9 % — SIGNIFICANT CHANGE UP (ref 10.3–14.5)
WBC # BLD: 6.5 K/UL — SIGNIFICANT CHANGE UP (ref 3.8–10.5)
WBC # FLD AUTO: 6.5 K/UL — SIGNIFICANT CHANGE UP (ref 3.8–10.5)

## 2022-01-21 PROCEDURE — 99233 SBSQ HOSP IP/OBS HIGH 50: CPT

## 2022-01-21 PROCEDURE — 99232 SBSQ HOSP IP/OBS MODERATE 35: CPT | Mod: GC

## 2022-01-21 RX ADMIN — SODIUM CHLORIDE 75 MILLILITER(S): 9 INJECTION INTRAMUSCULAR; INTRAVENOUS; SUBCUTANEOUS at 17:15

## 2022-01-21 RX ADMIN — PANTOPRAZOLE SODIUM 10 MG/HR: 20 TABLET, DELAYED RELEASE ORAL at 21:42

## 2022-01-21 RX ADMIN — PANTOPRAZOLE SODIUM 10 MG/HR: 20 TABLET, DELAYED RELEASE ORAL at 17:15

## 2022-01-21 RX ADMIN — Medication 1 GRAM(S): at 05:52

## 2022-01-21 RX ADMIN — Medication 81 MILLIGRAM(S): at 11:21

## 2022-01-21 RX ADMIN — Medication 1 GRAM(S): at 17:15

## 2022-01-21 RX ADMIN — Medication 1 GRAM(S): at 11:21

## 2022-01-21 RX ADMIN — SODIUM CHLORIDE 75 MILLILITER(S): 9 INJECTION INTRAMUSCULAR; INTRAVENOUS; SUBCUTANEOUS at 21:42

## 2022-01-21 RX ADMIN — Medication 1 GRAM(S): at 21:43

## 2022-01-21 RX ADMIN — ATORVASTATIN CALCIUM 40 MILLIGRAM(S): 80 TABLET, FILM COATED ORAL at 21:43

## 2022-01-21 NOTE — PROGRESS NOTE ADULT - ATTENDING COMMENTS
PUD with bleeding  Post EGD clip  Trend Hgb  Follow up clinically  Consider second look EGD
Pt care and plan discussed and reviewed with NP. Plan as outlined above edited by me to reflect our discussion.
No overt bleeding  continue ppi  regular diet

## 2022-01-21 NOTE — PROGRESS NOTE ADULT - ASSESSMENT
75 yo M with HTN, CAD s/p FRANCO in 2008 (on aspirin), and known gastric ulcer (diagnosed in 1999) who presents with near syncope + melena. En route to the ED, EMS reported an episode of melena during transfer and while in the ED, the patient had an additional episode of bloody bowel movement. Of note, the patient has a history of gastric ulcer, diagnosed in 1999 and related to his then history of smoking and alcohol use. He states he was in the hospital for 30 days during this evaluation. Since then he has not used alcohol or smoked cigarettes. He gets regular colonoscopies and endoscopies, last one reportedly 8 months ago and normal. He states he gets them every few years.      #UGIB 2/2 dudodenal ulcer  Patient presents with syncope in the setting of melena. Hgb trending down from 13 -> 8. O/w HDS. Rectal exam notable for melena. s/p EGD 1/19 showing duodenal ulcer Davin Class 1b with oozing s/p clip x2. Hgb dropped 1/19 8.1 -> 6.6 -> s/p 1 u pRBC with Hgb 8.6 x 2 on 1/20. Hgb now stable at 8-9.    Recommendations:  - trend vitals, CBC, and monitor for clinical signs of bleeding  - maintain active type and screen  - transfusion goal to maintain hemoglobin >/= 7.0  - avoid NSAIDs  - IV PPI gtt x 3 days (1/19-1/21), then can transition to IV PPI 40 mg BID until discharged.  - Patient should be on Protonix (pantoprazole) 40 mg BID for 8 weeks.  - Await pathology results.  - Use sucralfate tablets 1 gram PO QID for 2 weeks.  - OK to resume ASA 81 mg daily for CAD (s/p PCI). Would recommend PPI 40 mg daily while on ASA.    We will sign off at this time. Please reconsult/page if questions.        Maria Ines Fernch MD  GI Fellow, PGY-4  Available via [employer] Teams    NON-URGENT CONSULTS:  Please email [email or web address]  AT NIGHT AND ON WEEKENDS:  Contact on-call GI fellow via answering service ([phone]) from 5pm-8am and on weekends/holidays  MONDAY-FRIDAY 8AM-5PM:  Pager# [phone] ([facility]) or [phone] ([facility])  GI Phone# [phone] ([facility])       75 yo M with HTN, CAD s/p FRANCO in 2008 (on aspirin), and known gastric ulcer (diagnosed in 1999) who presents with near syncope + melena. En route to the ED, EMS reported an episode of melena during transfer and while in the ED, the patient had an additional episode of bloody bowel movement. Of note, the patient has a history of gastric ulcer, diagnosed in 1999 and related to his then history of smoking and alcohol use. He states he was in the hospital for 30 days during this evaluation. Since then he has not used alcohol or smoked cigarettes. He gets regular colonoscopies and endoscopies, last one reportedly 8 months ago and normal. He states he gets them every few years.      #UGIB 2/2 dudodenal ulcer  Patient presents with syncope in the setting of melena. Hgb trending down from 13 -> 8. O/w HDS. Rectal exam notable for melena. s/p EGD 1/19 showing duodenal ulcer Davin Class 1b with oozing s/p clip x2. Hgb dropped 1/19 8.1 -> 6.6 -> s/p 1 u pRBC with Hgb 8.6 x 2 on 1/20. Hgb now stable at 8-9.    Recommendations:  - trend vitals, CBC, and monitor for clinical signs of bleeding  - maintain active type and screen  - transfusion goal to maintain hemoglobin >/= 7.0  - avoid NSAIDs  - IV PPI gtt x 3 days (1/19-1/21), then can transition to IV PPI 40 mg BID until discharged.  - Patient should be on Protonix (pantoprazole) 40 mg BID for 8 weeks.  - Await pathology results.  - Use sucralfate tablets 1 gram PO QID for 2 weeks.  - OK to resume ASA 81 mg daily for CAD (s/p PCI). Would recommend PPI 40 mg daily while on ASA.  - advance to regular diet    Ok to discharge from GI standpoint. We will sign off at this time. Please reconsult/page if questions.        Maria Ines French MD  GI Fellow, PGY-4  Available via [employer] Teams    NON-URGENT CONSULTS:  Please email [email or web address]  [email or web address]  AT NIGHT AND ON WEEKENDS:  Contact on-call GI fellow via answering service ([phone]) from 5pm-8am and on weekends/holidays  MONDAY-FRIDAY 8AM-5PM:  Pager# [phone] ([facility]) or [phone] ([facility])  GI Phone# [phone] ([facility])

## 2022-01-22 ENCOUNTER — TRANSCRIPTION ENCOUNTER (OUTPATIENT)
Age: 77
End: 2022-01-22

## 2022-01-22 VITALS
OXYGEN SATURATION: 98 % | SYSTOLIC BLOOD PRESSURE: 135 MMHG | HEART RATE: 89 BPM | RESPIRATION RATE: 18 BRPM | TEMPERATURE: 98 F | DIASTOLIC BLOOD PRESSURE: 71 MMHG

## 2022-01-22 DIAGNOSIS — R55 SYNCOPE AND COLLAPSE: ICD-10-CM

## 2022-01-22 LAB
CULTURE RESULTS: SIGNIFICANT CHANGE UP
CULTURE RESULTS: SIGNIFICANT CHANGE UP
HCT VFR BLD CALC: 24.5 % — LOW (ref 39–50)
HGB BLD-MCNC: 8.2 G/DL — LOW (ref 13–17)
MCHC RBC-ENTMCNC: 32.2 PG — SIGNIFICANT CHANGE UP (ref 27–34)
MCHC RBC-ENTMCNC: 33.5 GM/DL — SIGNIFICANT CHANGE UP (ref 32–36)
MCV RBC AUTO: 96.1 FL — SIGNIFICANT CHANGE UP (ref 80–100)
NRBC # BLD: 0 /100 WBCS — SIGNIFICANT CHANGE UP (ref 0–0)
PLATELET # BLD AUTO: 269 K/UL — SIGNIFICANT CHANGE UP (ref 150–400)
RBC # BLD: 2.55 M/UL — LOW (ref 4.2–5.8)
RBC # FLD: 14 % — SIGNIFICANT CHANGE UP (ref 10.3–14.5)
SPECIMEN SOURCE: SIGNIFICANT CHANGE UP
SPECIMEN SOURCE: SIGNIFICANT CHANGE UP
WBC # BLD: 5.79 K/UL — SIGNIFICANT CHANGE UP (ref 3.8–10.5)
WBC # FLD AUTO: 5.79 K/UL — SIGNIFICANT CHANGE UP (ref 3.8–10.5)

## 2022-01-22 PROCEDURE — 88305 TISSUE EXAM BY PATHOLOGIST: CPT

## 2022-01-22 PROCEDURE — 86900 BLOOD TYPING SEROLOGIC ABO: CPT

## 2022-01-22 PROCEDURE — 99239 HOSP IP/OBS DSCHRG MGMT >30: CPT

## 2022-01-22 PROCEDURE — 83605 ASSAY OF LACTIC ACID: CPT

## 2022-01-22 PROCEDURE — 86923 COMPATIBILITY TEST ELECTRIC: CPT

## 2022-01-22 PROCEDURE — 83735 ASSAY OF MAGNESIUM: CPT

## 2022-01-22 PROCEDURE — C1889: CPT

## 2022-01-22 PROCEDURE — 82330 ASSAY OF CALCIUM: CPT

## 2022-01-22 PROCEDURE — 99285 EMERGENCY DEPT VISIT HI MDM: CPT

## 2022-01-22 PROCEDURE — 87086 URINE CULTURE/COLONY COUNT: CPT

## 2022-01-22 PROCEDURE — U0003: CPT

## 2022-01-22 PROCEDURE — 84295 ASSAY OF SERUM SODIUM: CPT

## 2022-01-22 PROCEDURE — P9016: CPT

## 2022-01-22 PROCEDURE — 80053 COMPREHEN METABOLIC PANEL: CPT

## 2022-01-22 PROCEDURE — 86850 RBC ANTIBODY SCREEN: CPT

## 2022-01-22 PROCEDURE — 85610 PROTHROMBIN TIME: CPT

## 2022-01-22 PROCEDURE — 84132 ASSAY OF SERUM POTASSIUM: CPT

## 2022-01-22 PROCEDURE — 82803 BLOOD GASES ANY COMBINATION: CPT

## 2022-01-22 PROCEDURE — 85730 THROMBOPLASTIN TIME PARTIAL: CPT

## 2022-01-22 PROCEDURE — 82947 ASSAY GLUCOSE BLOOD QUANT: CPT

## 2022-01-22 PROCEDURE — 96374 THER/PROPH/DIAG INJ IV PUSH: CPT

## 2022-01-22 PROCEDURE — 36430 TRANSFUSION BLD/BLD COMPNT: CPT

## 2022-01-22 PROCEDURE — U0005: CPT

## 2022-01-22 PROCEDURE — 87040 BLOOD CULTURE FOR BACTERIA: CPT

## 2022-01-22 PROCEDURE — 82435 ASSAY OF BLOOD CHLORIDE: CPT

## 2022-01-22 PROCEDURE — 86803 HEPATITIS C AB TEST: CPT

## 2022-01-22 PROCEDURE — 85014 HEMATOCRIT: CPT

## 2022-01-22 PROCEDURE — 86901 BLOOD TYPING SEROLOGIC RH(D): CPT

## 2022-01-22 PROCEDURE — 84100 ASSAY OF PHOSPHORUS: CPT

## 2022-01-22 PROCEDURE — 85025 COMPLETE CBC W/AUTO DIFF WBC: CPT

## 2022-01-22 PROCEDURE — 80048 BASIC METABOLIC PNL TOTAL CA: CPT

## 2022-01-22 PROCEDURE — C8929: CPT

## 2022-01-22 PROCEDURE — 81001 URINALYSIS AUTO W/SCOPE: CPT

## 2022-01-22 PROCEDURE — 93005 ELECTROCARDIOGRAM TRACING: CPT

## 2022-01-22 PROCEDURE — 85027 COMPLETE CBC AUTOMATED: CPT

## 2022-01-22 PROCEDURE — 85018 HEMOGLOBIN: CPT

## 2022-01-22 PROCEDURE — 71045 X-RAY EXAM CHEST 1 VIEW: CPT

## 2022-01-22 PROCEDURE — 36415 COLL VENOUS BLD VENIPUNCTURE: CPT

## 2022-01-22 PROCEDURE — 97161 PT EVAL LOW COMPLEX 20 MIN: CPT

## 2022-01-22 RX ORDER — PANTOPRAZOLE SODIUM 20 MG/1
1 TABLET, DELAYED RELEASE ORAL
Qty: 112 | Refills: 0
Start: 2022-01-22 | End: 2022-03-18

## 2022-01-22 RX ORDER — SUCRALFATE 1 G
1 TABLET ORAL
Qty: 56 | Refills: 0
Start: 2022-01-22 | End: 2022-02-04

## 2022-01-22 RX ORDER — PANTOPRAZOLE SODIUM 20 MG/1
1 TABLET, DELAYED RELEASE ORAL
Qty: 0 | Refills: 0 | DISCHARGE

## 2022-01-22 RX ORDER — PANTOPRAZOLE SODIUM 20 MG/1
40 TABLET, DELAYED RELEASE ORAL EVERY 12 HOURS
Refills: 0 | Status: DISCONTINUED | OUTPATIENT
Start: 2022-01-22 | End: 2022-01-22

## 2022-01-22 RX ADMIN — Medication 1 GRAM(S): at 12:21

## 2022-01-22 RX ADMIN — Medication 1 GRAM(S): at 05:42

## 2022-01-22 RX ADMIN — Medication 81 MILLIGRAM(S): at 12:21

## 2022-01-22 NOTE — DISCHARGE NOTE PROVIDER - NSDCMRMEDTOKEN_GEN_ALL_CORE_FT
aspirin 81 mg oral delayed release tablet: 1 tab(s) orally once a day  atorvastatin 20 mg oral tablet: 1 tab(s) orally once a day    Note: Pt has not started yet.  brimonidine 0.15% ophthalmic solution: 1 drop(s) to each affected eye 2 times a day  metFORMIN 500 mg oral tablet: 1 tab(s) orally once a day  Restasis 0.05% ophthalmic emulsion: 1 drop(s) to each affected eye every 12 hours  timolol maleate 0.5% ophthalmic solution: 1 drop(s) to each affected eye 2 times a day  valsartan 80 mg oral tablet: 1 tab(s) orally once a day   aspirin 81 mg oral delayed release tablet: 1 tab(s) orally once a day  atorvastatin 20 mg oral tablet: 1 tab(s) orally once a day    Note: Pt has not started yet.  brimonidine 0.15% ophthalmic solution: 1 drop(s) to each affected eye 2 times a day  metFORMIN 500 mg oral tablet: 1 tab(s) orally once a day  pantoprazole 40 mg oral delayed release tablet: 1 tab(s) orally every 12 hours      You will take for 8 weeks and then discontinue   Restasis 0.05% ophthalmic emulsion: 1 drop(s) to each affected eye every 12 hours  timolol maleate 0.5% ophthalmic solution: 1 drop(s) to each affected eye 2 times a day  valsartan 80 mg oral tablet: 1 tab(s) orally once a day   aspirin 81 mg oral delayed release tablet: 1 tab(s) orally once a day  atorvastatin 20 mg oral tablet: 1 tab(s) orally once a day    Note: Pt has not started yet.  brimonidine 0.15% ophthalmic solution: 1 drop(s) to each affected eye 2 times a day  Carafate 1 g oral tablet: 1 tab(s) orally 4 times a day  metFORMIN 500 mg oral tablet: 1 tab(s) orally once a day  pantoprazole 40 mg oral delayed release tablet: 1 tab(s) orally every 12 hours      You will take for 8 weeks and then discontinue   Restasis 0.05% ophthalmic emulsion: 1 drop(s) to each affected eye every 12 hours  timolol maleate 0.5% ophthalmic solution: 1 drop(s) to each affected eye 2 times a day  valsartan 80 mg oral tablet: 1 tab(s) orally once a day

## 2022-01-22 NOTE — PROGRESS NOTE ADULT - REASON FOR ADMISSION
Presyncope in the setting of melena

## 2022-01-22 NOTE — PROGRESS NOTE ADULT - PROBLEM SELECTOR PLAN 3
- likely secondary to hypovolumic from melena  - orthostatic vitals negative by criteria but bp low normal asymptomatic  --c/w IVF  -TTE pending  -cards consult pending
prerenal due to hypovolemia from GIB  -cr improved, likely back to baseline after IVF  -trend bmp
prerenal due to hypovolemia from GIB  - Cr improved, likely back to baseline after IVF  - trend bmp
prerenal due to hypovolemia from GIB  -cr improved, likely back to baseline after IVF  -trend bmp

## 2022-01-22 NOTE — PROGRESS NOTE ADULT - PROBLEM SELECTOR PROBLEM 4
CAD (coronary artery disease)
PATY (acute kidney injury)

## 2022-01-22 NOTE — DISCHARGE NOTE PROVIDER - NSFOLLOWUPCLINICS_GEN_ALL_ED_FT
[facility] General Internal Medicine  General Internal Medicine  [address] [place] [address]  Phone: [phone]  Fax:

## 2022-01-22 NOTE — DISCHARGE NOTE NURSING/CASE MANAGEMENT/SOCIAL WORK - PATIENT PORTAL LINK FT
You can access the FollowMyHealth Patient Portal offered by [facility] by registering at the following website: [email or web address]. By joining [facility]’s FollowMyHealth portal, you will also be able to view your health information using other applications (apps) compatible with our system.

## 2022-01-22 NOTE — DISCHARGE NOTE PROVIDER - HOSPITAL COURSE
75 yo M with HTN, CAD s/p FRANCO in 2008 (on aspirin), and known gastric ulcer (diagnosed in 1999) who presents with near syncope in the setting melena admitted with acute blood loss anemia likely due to UGIB.      Problem/Plan - 1:  ·  Problem: Acute blood loss anemia.   ·  Plan: - h/h now stable.    - s/p EGD (1/19) showing active duodenal ulcer bleed and other nonbleeding ulcers   -  on Protonix gtt (1/19) x 3 days. cont until 1/22 then PO BID.  - sucrafate QID   - trend CBC daily now  ,transfuse for hgb<7 or if HD instability  - ok to resume on ASA (last PCI 2008).     Problem/Plan - 2:  ·  Problem: Near syncope.   ·  Plan: - likely secondary to hypovolumic from melena  -TTE  without significant change or findings.   -cards consult appreciated-.     Problem/Plan - 3:  ·  Problem: PATY (acute kidney injury).   ·  Plan: prerenal due to hypovolemia from GIB  -cr improved, likely back to baseline after IVF  -trend bmp.     Problem/Plan - 4:  ·  Problem: CAD (coronary artery disease).   ·  Plan: S/p FRANCO in 2008, on ASA 81mg daily at home  - no cp, sob  - restart aspirin per GI  - continue atorvastatin  - hold BP meds  - TTE done.  - cards consulted appreciated.     Problem/Plan - 5:  ·  Problem: Prophylactic measure.   ·  Plan: - DVT ppx: SCDs given melena  - GI ppx: protonix.       75 yo M with HTN, CAD s/p FRANCO in 2008 (on aspirin), and known gastric ulcer (diagnosed in 1999) who presents with near syncope in the setting melena admitted with acute blood loss anemia likely due to UGIB.      Problem/Plan - 1:  ·  Problem: Acute blood loss anemia.   ·  Plan: - h/h now stable.    - s/p EGD (1/19) showing active duodenal ulcer bleed and other nonbleeding ulcers   -  on Protonix gtt (1/19) x 3 days. cont until 1/22 then PO BID.  - sucrafate QID   - trend CBC daily now  ,transfuse for hgb<7 or if HD instability  - ok to resume on ASA (last PCI 2008).     Problem/Plan - 2:  ·  Problem: Near syncope.   ·  Plan: - likely secondary to hypovolumic from melena  -TTE  without significant change or findings.   -cards consult appreciated-.     Problem/Plan - 3:  ·  Problem: PATY (acute kidney injury).   ·  Plan: prerenal due to hypovolemia from GIB  -cr improved, likely back to baseline after IVF  -trend bmp.     Problem/Plan - 4:  ·  Problem: CAD (coronary artery disease).   ·  Plan: S/p FRANCO in 2008, on ASA 81mg daily at home  - no cp, sob  - restart aspirin per GI  - continue atorvastatin  - hold BP meds  - TTE done.  - cards consulted appreciated.     Problem/Plan - 5:  ·  Problem: Prophylactic measure.   ·  Plan: - DVT ppx: SCDs given melena  - GI ppx: protonix.       75 yo M with HTN, CAD s/p FRANCO in 2008 (on aspirin), and known gastric ulcer (diagnosed in 1999) who presents with near syncope in the setting melena admitted with acute blood loss anemia likely due to UGIB.      Problem/Plan - 1:  ·  Problem: Acute blood loss anemia.   ·  Plan: - Hb stable  - s/p EGD (1/19) showing active duodenal ulcer bleed and other nonbleeding ulcers   - completed protonix gtt x 72 hours this morning 1/22  - transition to pantoprazole 40mg PO BID x 8 weeks on discharge  - c/w sucralfate 1g QID x 2 weeks  - trend Hb daily  - ok to resume on ASA (last PCI 2008)     Problem/Plan - 2:  ·  Problem: Near syncope.   ·  Plan: - likely secondary to hypovolumic from melena  -TTE  without significant change or findings.   -cards consult appreciated-.     Problem/Plan - 3:  ·  Problem: PATY (acute kidney injury).   ·  Plan: prerenal due to hypovolemia from GIB  -cr improved, likely back to baseline after IVF  -trend bmp.     Problem/Plan - 4:  ·  Problem: CAD (coronary artery disease).   ·  Plan: S/p FRANCO in 2008, on ASA 81mg daily at home  - no cp, sob  - restart aspirin per GI  - continue atorvastatin  - hold BP meds  - TTE done.  - cards consulted appreciated.     Problem/Plan - 5:  ·  Problem: Prophylactic measure.   ·  Plan: - DVT ppx: SCDs given melena  - GI ppx: protonix.    Medically clear for discharge home today 1/22/22.   75 yo M with HTN, CAD s/p FRANCO in 2008 (on aspirin), and known gastric ulcer (diagnosed in 1999) who presents with near syncope in the setting melena admitted with acute blood loss anemia likely due to UGIB.      Problem/Plan - 1:  ·  Problem: Acute blood loss anemia.   ·  Plan: - Hb stable  - s/p EGD (1/19) showing active duodenal ulcer bleed and other nonbleeding ulcers   - completed protonix gtt x 72 hours this morning 1/22  - transition to pantoprazole 40mg PO BID x 8 weeks on discharge  - c/w sucralfate 1g QID x 2 weeks  - trend Hb daily  - ok to resume on ASA (last PCI 2008)      Problem/Plan - 2:  ·  Problem: Near syncope.   ·  Plan: - likely secondary to hypovolumic from melena  -TTE  without significant change or findings.   -cards consult appreciated-.     Problem/Plan - 3:  ·  Problem: PATY (acute kidney injury).   ·  Plan: prerenal due to hypovolemia from GIB  -cr improved, likely back to baseline after IVF  -trend bmp.     Problem/Plan - 4:  ·  Problem: CAD (coronary artery disease).   ·  Plan: S/p FRANCO in 2008, on ASA 81mg daily at home  - no cp, sob  - restart aspirin per GI  - continue atorvastatin  - hold BP meds  - TTE done.  - cards consulted appreciated.     Problem/Plan - 5:  ·  Problem: Prophylactic measure.   ·  Plan: - DVT ppx: SCDs given melena  - GI ppx: protonix.    Medically clear for discharge home today 1/22/22.

## 2022-01-22 NOTE — PROGRESS NOTE ADULT - PROBLEM SELECTOR PROBLEM 3
PATY (acute kidney injury)
Near syncope
PATY (acute kidney injury)

## 2022-01-22 NOTE — PROGRESS NOTE ADULT - PROVIDER SPECIALTY LIST ADULT
Cardiology
Cardiology
Gastroenterology
Cardiology
Gastroenterology
Hospitalist
Hospitalist
Internal Medicine

## 2022-01-22 NOTE — DISCHARGE NOTE PROVIDER - CARE PROVIDERS DIRECT ADDRESSES
[email or web address]

## 2022-01-22 NOTE — DISCHARGE NOTE NURSING/CASE MANAGEMENT/SOCIAL WORK - NSDCPEFALRISK_GEN_ALL_CORE
For information on Fall & Injury Prevention, visit: [email or web address] OR  [email or web address] OR  https://www.cdc.gov/steadi/patient.html

## 2022-01-22 NOTE — PROGRESS NOTE ADULT - PROBLEM SELECTOR PLAN 4
S/p FRANOC in 2008, on ASA 81mg daily at home  - no cp, sob  - restart aspirin per GI  - continue atorvastatin  - hold BP meds  - TTE done.  - cards consulted appreciated
prerenal due to hypovolemia from GIB  -cr improved, likely back to baseline after IVF  -trend bmp
S/p FRANCO in 2008, on ASA 81mg daily at home  - no cp, sob  - restart aspirin per GI  - continue atorvastatin  - hold BP meds  - TTE done.  - cards consulted appreciated
S/p FRANCO in 2008, on ASA 81mg daily at home  - no cp, sob  - restart aspirin per GI  - continue atorvastatin  - hold BP meds  - TTE done.  - cards consulted appreciated
S/p FRANCO in 2008, on ASA 81mg daily at home  - no cp, sob  - restart aspirin per GI  - continue atorvastatin  - hold BP meds  - TTE pending  - cards consulted appreciated
S/p FRANCO in 2008, on ASA 81mg daily at home  - no cp, sob  - restart aspirin per GI  - continue atorvastatin  - hold BP meds  - TTE done.  - cards consulted appreciated

## 2022-01-22 NOTE — PROGRESS NOTE ADULT - SUBJECTIVE AND OBJECTIVE BOX
Date of Service   01-18-22 @ 12:21    Patient is a 76y old  Male who presents with a chief complaint of Presyncope in the setting of melena (17 Jan 2022 23:11)      INTERVAL HISTORY: pt had episode of orthostasis this am     REVIEW OF SYSTEMS:   CONSTITUTIONAL: No weakness  EYES/ENT: No visual changes; No throat pain  Neck: No pain or stiffness  Respiratory: No cough, wheezing, No shortness of breath  CARDIOVASCULAR: no chest pain or palpitations  GASTROINTESTINAL: No abdominal pain, no nausea, vomiting or hematemesis  GENITOURINARY: No dysuria, frequency or hematuria  NEUROLOGICAL: No stroke like symptoms  SKIN: No rashes    	  MEDICATIONS:        PHYSICAL EXAM:  T(C): 36.8 (01-18-22 @ 11:50), Max: 37.1 (01-17-22 @ 21:40)  HR: 102 (01-18-22 @ 11:50) (77 - 102)  BP: 117/79 (01-18-22 @ 11:50) (102/67 - 117/79)  RR: 18 (01-18-22 @ 11:50) (17 - 18)  SpO2: 96% (01-18-22 @ 11:50) (94% - 96%)  Wt(kg): --  I&O's Summary    17 Jan 2022 07:01  -  18 Jan 2022 07:00  --------------------------------------------------------  IN: 0 mL / OUT: 350 mL / NET: -350 mL          Appearance: In no distress	  HEENT:    PERRL, EOMI	  Cardiovascular:  S1 S2, No JVD  Respiratory: Lungs clear to auscultation	  Gastrointestinal:  Soft, Non-tender, + BS	  Vascularature:  No edema of LE  Psychiatric: Appropriate affect   Neuro: no acute focal deficits                               9.6    8.25  )-----------( 271      ( 18 Jan 2022 10:21 )             28.2     01-18    136  |  102  |  30<H>  ----------------------------<  116<H>  4.1   |  23  |  0.80    Ca    8.6      18 Jan 2022 06:34  Phos  4.0     01-17  Mg     1.7     01-17    TPro  7.1  /  Alb  4.2  /  TBili  0.4  /  DBili  x   /  AST  26  /  ALT  35  /  AlkPhos  52  01-16        Labs personally reviewed      ASSESSMENT/PLAN: 	    75 yo M with HTN, CAD s/p FRANCO in 2008 (on aspirin), and known gastric ulcer (diagnosed in 1999) who presents with near syncope in the setting melena admitted with acute blood loss anemia likely due to UGIB. Pt reports having ECHO done about 4 months ago and was reportedly normal. Pt had cardiac cath 2-3 years ago and no stent was needed. Prior to admission pt had no issue going up stairs and walking without CP.         Problem/Plan - 1:  ·  Problem: Risk Stratification   - pt in good functional capacity   - EKG non-ischemic  - No signs of tachy/shania syndrome   - pt is hemodynamically stable   - pt looks euvolemic doesn't appear to be in decompensated heart failure   - pt is moderate risk for EGD, no contraindication to proceed     Problem/Plan - 2:  ·  Problem: CAD S/p FRANCO in 2008,   - c/w home ASA 81mg daily and statin   - TTE pending    Problem/Plan - 3:  ·  Problem: HTN   - c/w home meds and hold within parameters   - monitor BP     Problem/Plan - 4:  ·  Problem: Acute blood loss anemia likely UGIB,  hx of gastric ulcer  - Hgb downtrending 9.6 from 13 on admit  - Monitor CBC  - transfuse for hgb<7 or if HD instability  - on PPI   - planning for EGD     Problem/Plan - 5 :  ·	Problem: Near syncope.   - likely 2/2 to hypovolemic from melena  - c/w IVF  - pt had episode of near syncope this am   - + orthostatics   - TTE pending    Problem/Plan -6: Problem:- DVT ppx: SCDs given melena          Curt Barton FN-BC   Eddie Lucero DO [facility]  Cardiovascular Medicine  [address], [address]  Office: [phone]  Cell: [phone]
[facility] Division of Hospital Medicine  Micheline Lui MD  Pager (BOGDAN-LUX, 9B-5P): [phone]  Other Times:  [phone]    Patient is a 76y old  Male who presents with a chief complaint of Presyncope in the setting of melena (2022 12:21)      SUBJECTIVE / OVERNIGHT EVENTS: via   afebrile. had an episode of presyncope while checking orthostatics this morning. still reporting melena in am.   denies any SOB or Chest pain.     ADDITIONAL REVIEW OF SYSTEMS: otherwise neg    MEDICATIONS  (STANDING):  aspirin enteric coated 81 milliGRAM(s) Oral daily  atorvastatin 40 milliGRAM(s) Oral at bedtime  pantoprazole  Injectable 40 milliGRAM(s) IV Push every 12 hours  sodium chloride 0.9%. 1000 milliLiter(s) (75 mL/Hr) IV Continuous <Continuous>    MEDICATIONS  (PRN):  acetaminophen     Tablet .. 650 milliGRAM(s) Oral every 6 hours PRN Temp greater or equal to 38C (100.4F), Mild Pain (1 - 3)  aluminum hydroxide/magnesium hydroxide/simethicone Suspension 30 milliLiter(s) Oral every 4 hours PRN Dyspepsia  melatonin 3 milliGRAM(s) Oral at bedtime PRN Insomnia  ondansetron Injectable 4 milliGRAM(s) IV Push every 8 hours PRN Nausea and/or Vomiting      CAPILLARY BLOOD GLUCOSE        I&O's Summary    2022 07:01  -  2022 07:00  --------------------------------------------------------  IN: 0 mL / OUT: 350 mL / NET: -350 mL    2022 07:01  -  2022 15:06  --------------------------------------------------------  IN: 0 mL / OUT: 750 mL / NET: -750 mL        PHYSICAL EXAM:  Vital Signs Last 24 Hrs  T(C): 36.8 (2022 11:50), Max: 37.1 (2022 21:40)  T(F): 98.2 (2022 11:50), Max: 98.8 (2022 21:40)  HR: 102 (2022 11:50) (77 - 102)  BP: 117/79 (2022 11:50) (102/67 - 117/79)  BP(mean): 92 (2022 11:50) (92 - 92)  RR: 18 (2022 11:50) (17 - 18)  SpO2: 96% (2022 11:50) (94% - 96%)    CONSTITUTIONAL: NAD, well-groomed  EYES:  conjunctiva and sclera clear  ENMT: Moist oral mucosa  NECK: Supple, no palpable masses; no JVD  RESPIRATORY: Normal respiratory effort; lungs are clear to auscultation bilaterally  CARDIOVASCULAR: Regular rate and rhythm, normal S1 and S2, no murmur/rub/gallop; No lower extremity edema  ABDOMEN: Nontender to palpation, normoactive bowel sounds, no rebound/guarding  MUSCULOSKELETAL:  no clubbing or cyanosis of digits; no joint swelling or tenderness to palpation  PSYCH: A+O to person, place, and time; affect appropriate  SKIN: No rashes; no palpable lesions    LABS:                        9.6    8.25  )-----------( 271      ( 2022 10:21 )             28.2     18    136  |  102  |  30<H>  ----------------------------<  116<H>  4.1   |  23  |  0.80    Ca    8.6      2022 06:34  Phos  4.0       Mg     1.7         TPro  7.1  /  Alb  4.2  /  TBili  0.4  /  DBili  x   /  AST  26  /  ALT  35  /  AlkPhos  52  -16    PT/INR - ( 2022 21:16 )   PT: 13.6 sec;   INR: 1.14 ratio         PTT - ( 2022 21:16 )  PTT:24.8 sec      Urinalysis Basic - ( 2022 00:20 )    Color: Light Yellow / Appearance: Clear / S.025 / pH: x  Gluc: x / Ketone: Small  / Bili: Negative / Urobili: Negative   Blood: x / Protein: Trace / Nitrite: Negative   Leuk Esterase: Negative / RBC: 1 /hpf / WBC 1 /HPF   Sq Epi: x / Non Sq Epi: 0 /hpf / Bacteria: Negative        Culture - Urine (collected 2022 06:41)  Source: Clean Catch Clean Catch (Midstream)  Final Report (2022 09:06):    <10,000 CFU/mL Normal Urogenital Ashley    Culture - Blood (collected 2022 02:15)  Source: .Blood Blood-Peripheral  Preliminary Report (2022 03:01):    No growth to date.    Culture - Blood (collected 2022 02:15)  Source: .Blood Blood-Venous  Preliminary Report (2022 03:01):    No growth to date.        RADIOLOGY & ADDITIONAL TESTS:  Results Reviewed:   Imaging Personally Reviewed:  Electrocardiogram Personally Reviewed:    COORDINATION OF CARE:  Care Discussed with Consultants/Other Providers [Y/N]:  Prior or Outpatient Records Reviewed [Y/N]:  
[facility] Division of Hospital Medicine  Micheline Lui MD  Pager (BOGDAN-LUX, 1L-5P): [phone]  Other Times:  [phone]    Patient is a 76y old  Male who presents with a chief complaint of Presyncope in the setting of melena (20 Jan 2022 14:26)      SUBJECTIVE / OVERNIGHT EVENTS:  afebrile, feeling well. denies any dizziness or SOB. had BM this am still dark.   otherwise neg    ADDITIONAL REVIEW OF SYSTEMS: otherwise neg    MEDICATIONS  (STANDING):  aspirin enteric coated 81 milliGRAM(s) Oral daily  atorvastatin 40 milliGRAM(s) Oral at bedtime  pantoprazole Infusion 8 mG/Hr (10 mL/Hr) IV Continuous <Continuous>  sodium chloride 0.9%. 1000 milliLiter(s) (75 mL/Hr) IV Continuous <Continuous>  sucralfate 1 Gram(s) Oral four times a day    MEDICATIONS  (PRN):  acetaminophen     Tablet .. 650 milliGRAM(s) Oral every 6 hours PRN Temp greater or equal to 38C (100.4F), Mild Pain (1 - 3)  aluminum hydroxide/magnesium hydroxide/simethicone Suspension 30 milliLiter(s) Oral every 4 hours PRN Dyspepsia  melatonin 3 milliGRAM(s) Oral at bedtime PRN Insomnia  ondansetron Injectable 4 milliGRAM(s) IV Push every 8 hours PRN Nausea and/or Vomiting      CAPILLARY BLOOD GLUCOSE        I&O's Summary    19 Jan 2022 07:01  -  20 Jan 2022 07:00  --------------------------------------------------------  IN: 710 mL / OUT: 400 mL / NET: 310 mL    20 Jan 2022 07:01  -  20 Jan 2022 15:45  --------------------------------------------------------  IN: 480 mL / OUT: 0 mL / NET: 480 mL        PHYSICAL EXAM:  Vital Signs Last 24 Hrs  T(C): 36.6 (20 Jan 2022 13:41), Max: 37.1 (19 Jan 2022 20:45)  T(F): 97.8 (20 Jan 2022 13:41), Max: 98.8 (19 Jan 2022 20:45)  HR: 97 (20 Jan 2022 13:41) (79 - 101)  BP: 138/83 (20 Jan 2022 13:41) (118/75 - 150/70)  BP(mean): --  RR: 18 (20 Jan 2022 13:41) (18 - 18)  SpO2: 98% (20 Jan 2022 13:41) (95% - 98%)    CONSTITUTIONAL: NAD, well-groomed  EYES:  conjunctiva and sclera clear  ENMT: Moist oral mucosa  NECK: Supple, no palpable masses; no JVD  RESPIRATORY: Normal respiratory effort; lungs are clear to auscultation bilaterally  CARDIOVASCULAR: Regular rate and rhythm, normal S1 and S2, no murmur/rub/gallop; No lower extremity edema  ABDOMEN: Nontender to palpation, normoactive bowel sounds, no rebound/guarding  MUSCULOSKELETAL:  no clubbing or cyanosis of digits; no joint swelling or tenderness to palpation  PSYCH: A+O to person, place, and time; affect appropriate  SKIN: No rashes; no palpable lesions    LABS:                        8.5    6.05  )-----------( 228      ( 20 Jan 2022 12:28 )             25.0     01-20    138  |  106  |  11  ----------------------------<  108<H>  3.5   |  20<L>  |  0.76    Ca    8.1<L>      20 Jan 2022 06:54                  RADIOLOGY & ADDITIONAL TESTS:  Results Reviewed:   Imaging Personally Reviewed:  Electrocardiogram Personally Reviewed:    COORDINATION OF CARE:  Care Discussed with Consultants/Other Providers [Y/N]:  Prior or Outpatient Records Reviewed [Y/N]:  
  Chief Complaint:  Patient is a 76y old  Male who presents with a chief complaint of Presyncope in the setting of melena (20 Jan 2022 15:45)      Interval Events:   Reports feeling well still having scant melena (likely old blood). Denies any N/V/D/C, abd pain or hematochezia.  s/p EGD 1/19 showing duodenal ulcer Davin Class 1b with oozing s/p clip x2.   Hgb dropped yesterday 8.1 -> 6.6 -> s/p 1 u pRBC with Hgb 8.6 x 2.    Hospital Medications:  acetaminophen     Tablet .. 650 milliGRAM(s) Oral every 6 hours PRN  aluminum hydroxide/magnesium hydroxide/simethicone Suspension 30 milliLiter(s) Oral every 4 hours PRN  aspirin enteric coated 81 milliGRAM(s) Oral daily  atorvastatin 40 milliGRAM(s) Oral at bedtime  melatonin 3 milliGRAM(s) Oral at bedtime PRN  ondansetron Injectable 4 milliGRAM(s) IV Push every 8 hours PRN  pantoprazole Infusion 8 mG/Hr IV Continuous <Continuous>  sodium chloride 0.9%. 1000 milliLiter(s) IV Continuous <Continuous>  sucralfate 1 Gram(s) Oral four times a day      PMHX/PSHX:  HTN (hypertension)    CAD (coronary artery disease)    Gastric ulcer    No significant past surgical history            ROS: 14 point ROS negative unless otherwise stated in subjective      PHYSICAL EXAM:     GENERAL:  Well developed, no distress  HEENT:  NC/AT,  conjunctivae clear, sclera anicteric  CHEST:  Full & symmetric excursion, no increased effort w/ respirations  HEART:  Regular rhythm & rate  ABDOMEN:  Soft, non-tender, non-distended  EXTREMITIES:  no LE  edema  SKIN:  No rash/erythema/ecchymoses/petechiae/wounds/jaundice  NEURO:  Alert, orientedx3    Vital Signs:  Vital Signs Last 24 Hrs  T(C): 36.6 (20 Jan 2022 13:41), Max: 37.1 (19 Jan 2022 20:45)  T(F): 97.8 (20 Jan 2022 13:41), Max: 98.8 (19 Jan 2022 20:45)  HR: 97 (20 Jan 2022 13:41) (79 - 101)  BP: 138/83 (20 Jan 2022 13:41) (118/75 - 150/70)  BP(mean): --  RR: 18 (20 Jan 2022 13:41) (18 - 18)  SpO2: 98% (20 Jan 2022 13:41) (95% - 98%)  Daily     Daily     LABS:                        8.5    6.05  )-----------( 228      ( 20 Jan 2022 12:28 )             25.0     01-20    138  |  106  |  11  ----------------------------<  108<H>  3.5   |  20<L>  |  0.76    Ca    8.1<L>      20 Jan 2022 06:54                Imaging: No new abdominal imaging            
DATE OF SERVICE: 01-19-22      Patient is a 76y old  Male who presents with a chief complaint of Presyncope in the setting of melena (19 Jan 2022 15:01)      INTERVAL HISTORY: feels ok      PHYSICAL EXAM:  T(C): 36.9 (01-19-22 @ 21:07), Max: 37.1 (01-19-22 @ 20:45)  HR: 83 (01-19-22 @ 21:07) (82 - 101)  BP: 130/82 (01-19-22 @ 21:07) (101/62 - 150/70)  RR: 18 (01-19-22 @ 21:07) (12 - 18)  SpO2: 98% (01-19-22 @ 21:07) (95% - 100%)  Wt(kg): --  I&O's Summary    18 Jan 2022 07:01  -  19 Jan 2022 07:00  --------------------------------------------------------  IN: 900 mL / OUT: 750 mL / NET: 150 mL    19 Jan 2022 07:01  -  19 Jan 2022 22:13  --------------------------------------------------------  IN: 0 mL / OUT: 0 mL / NET: 0 mL      Height (cm): 170.6 (01-19 @ 11:56)  Weight (kg): 95.3 (01-19 @ 11:56)  BMI (kg/m2): 32.7 (01-19 @ 11:56)  BSA (m2): 2.07 (01-19 @ 11:56)    Appearance: In no distress	  HEENT:    PERRL, EOMI	  Cardiovascular:  S1 S2, No JVD  Respiratory: Lungs clear to auscultation	  Gastrointestinal:  Soft, Non-tender, + BS	  Vascularature:  No edema of LE  Psychiatric: Appropriate affect   Neuro: no acute focal deficits                               6.6    5.47  )-----------( 189      ( 19 Jan 2022 16:45 )             19.6     01-19    139  |  105  |  24<H>  ----------------------------<  118<H>  3.7   |  22  |  0.84    Ca    8.5      19 Jan 2022 07:24          Labs personally reviewed      ASSESSMENT/PLAN: 	    77 yo M with HTN, CAD s/p FRANCO in 2008 (on aspirin), and known gastric ulcer (diagnosed in 1999) who presents with near syncope in the setting melena admitted with acute blood loss anemia likely due to UGIB. Pt reports having ECHO done about 4 months ago and was reportedly normal. Pt had cardiac cath 2-3 years ago and no stent was needed. Prior to admission pt had no issue going up stairs and walking without CP.         Problem/Plan - 1:  ·  Problem: Risk Stratification   - pt in good functional capacity   - EKG non-ischemic  - No signs of tachy/shania syndrome   - pt is hemodynamically stable   - pt looks euvolemic doesn't appear to be in decompensated heart failure   - pt is moderate risk for EGD, no contraindication to proceed     Problem/Plan - 2:  ·  Problem: CAD S/p FRANCO in 2008,   - c/w home ASA 81mg daily and statin   - TTE pending    Problem/Plan - 3:  ·  Problem: HTN   - c/w home meds and hold within parameters   - monitor BP     Problem/Plan - 4:  ·  Problem: Acute blood loss anemia likely UGIB,  hx of gastric ulcer  - Hgb downtrending 9.6 from 13 on admit  - Monitor CBC  - transfuse for hgb<7 or if HD instability  - on PPI   - planning for EGD     Problem/Plan - 5 :  ·	Problem: Near syncope.   - likely 2/2 to hypovolemic from melena  - c/w IVF  - pt had episode of near syncope this am   - + orthostatics   - TTE pending    Problem/Plan -6: Problem:- DVT ppx: SCDs given melena          Eddie Lucero DO [facility]  Cardiovascular Medicine  [address] [place] [address], [address]  Office: [phone]  Cell: [phone]
DATE OF SERVICE: 01-21-22      Patient is a 76y old  Male who presents with a chief complaint of Presyncope in the setting of melena (21 Jan 2022 15:21)      INTERVAL HISTORY: feels ok      PHYSICAL EXAM:  T(C): 36.8 (01-21-22 @ 17:31), Max: 36.9 (01-21-22 @ 05:52)  HR: 88 (01-21-22 @ 17:31) (81 - 88)  BP: 115/65 (01-21-22 @ 17:31) (111/72 - 115/65)  RR: 18 (01-21-22 @ 17:31) (18 - 18)  SpO2: 96% (01-21-22 @ 17:31) (95% - 96%)  Wt(kg): --  I&O's Summary    20 Jan 2022 07:01  -  21 Jan 2022 07:00  --------------------------------------------------------  IN: 2080 mL / OUT: 300 mL / NET: 1780 mL    21 Jan 2022 07:01  -  21 Jan 2022 21:33  --------------------------------------------------------  IN: 120 mL / OUT: 0 mL / NET: 120 mL          Appearance: In no distress	  HEENT:    PERRL, EOMI	  Cardiovascular:  S1 S2, No JVD  Respiratory: Lungs clear to auscultation	  Gastrointestinal:  Soft, Non-tender, + BS	  Vascularature:  No edema of LE  Psychiatric: Appropriate affect   Neuro: no acute focal deficits                               8.7    6.50  )-----------( 260      ( 21 Jan 2022 07:20 )             25.6     01-20    138  |  106  |  11  ----------------------------<  108<H>  3.5   |  20<L>  |  0.76    Ca    8.1<L>      20 Jan 2022 06:54          Labs personally reviewed      ASSESSMENT/PLAN: 	    75 yo M with HTN, CAD s/p FRANCO in 2008 (on aspirin), and known gastric ulcer (diagnosed in 1999) who presents with near syncope in the setting melena admitted with acute blood loss anemia likely due to UGIB. Pt reports having ECHO done about 4 months ago and was reportedly normal. Pt had cardiac cath 2-3 years ago and no stent was needed. Prior to admission pt had no issue going up stairs and walking without CP.         Problem/Plan - 1:  ·  Problem: Risk Stratification   - pt in good functional capacity   - EKG non-ischemic  - No signs of tachy/shania syndrome   - pt is hemodynamically stable   - pt looks euvolemic doesn't appear to be in decompensated heart failure   - tolerated procedure well    Problem/Plan - 2:  ·  Problem: CAD S/p FRANCO in 2008,   - c/w ho with overall preserved LV function    Problem/Plan - 3:  ·  Problem: HTN   - c/w home meds and hold within parameters   - monitor BP     Problem/Plan - 4:  ·  Problem: Acute blood loss anemia likely UGIB,  hx of gastric ulcer  - Hgb downtrending 9.6 from 13 on admit  - Monitor CBC  - transfuse for hgb<7 or if HD instability  - on PPI   - planning for EGD     Problem/Plan - 5 :  ·	Problem: Near syncope.   - likely 2/2 to hypovolemic from melena  - c/w IVF  - pt had episode of near syncope this am   - + orthostatics   - overall preserved LV function               Eddie Lucero DO [facility]  Cardiovascular Medicine  [address] [facility], [address]  Office: [phone]  Cell: [phone]
  Chief Complaint:  Patient is a 76y old  Male who presents with a chief complaint of Presyncope in the setting of melena (20 Jan 2022 16:04)      Interval Events:   Reports feeling well. Denies any N/V/D/C, abd pain, melena or hematochezia.  Hgb stable at 8-9 in the last 24 hours.    Hospital Medications:  acetaminophen     Tablet .. 650 milliGRAM(s) Oral every 6 hours PRN  aluminum hydroxide/magnesium hydroxide/simethicone Suspension 30 milliLiter(s) Oral every 4 hours PRN  aspirin enteric coated 81 milliGRAM(s) Oral daily  atorvastatin 40 milliGRAM(s) Oral at bedtime  melatonin 3 milliGRAM(s) Oral at bedtime PRN  ondansetron Injectable 4 milliGRAM(s) IV Push every 8 hours PRN  pantoprazole Infusion 8 mG/Hr IV Continuous <Continuous>  sodium chloride 0.9%. 1000 milliLiter(s) IV Continuous <Continuous>  sucralfate 1 Gram(s) Oral four times a day      PMHX/PSHX:  HTN (hypertension)    CAD (coronary artery disease)    Gastric ulcer    No significant past surgical history            ROS: 14 point ROS negative unless otherwise stated in subjective      PHYSICAL EXAM:     GENERAL:  Well developed, no distress  HEENT:  NC/AT,  conjunctivae clear, sclera anicteric  CHEST:  Full & symmetric excursion, no increased effort w/ respirations  HEART:  Regular rhythm & rate  ABDOMEN:  Soft, non-tender, non-distended  EXTREMITIES:  no LE  edema  SKIN:  No rash/erythema/ecchymoses/petechiae/wounds/jaundice  NEURO:  Alert, oriented    Vital Signs:  Vital Signs Last 24 Hrs  T(C): 36.8 (21 Jan 2022 00:03), Max: 36.8 (20 Jan 2022 20:09)  T(F): 98.3 (21 Jan 2022 00:03), Max: 98.3 (21 Jan 2022 00:03)  HR: 83 (21 Jan 2022 00:03) (83 - 97)  BP: 114/80 (21 Jan 2022 00:03) (113/69 - 148/79)  BP(mean): --  RR: 18 (21 Jan 2022 00:03) (18 - 18)  SpO2: 95% (21 Jan 2022 00:03) (95% - 98%)  Daily     Daily     LABS:                        8.8    6.31  )-----------( 247      ( 20 Jan 2022 18:31 )             25.7     01-20    138  |  106  |  11  ----------------------------<  108<H>  3.5   |  20<L>  |  0.76    Ca    8.1<L>      20 Jan 2022 06:54                Imaging: No new abdominal imaging            
  Patient is a 76y old  Male who presents with a chief complaint of Presyncope in the setting of melena (2022 02:35)      SUBJECTIVE / OVERNIGHT EVENTS: No On events. Reports No BM, no melena, no hematochezia since yesterday but thinks he has to go to bathroom soon. Denies dizziness, abd pain, chest pain, n/v, headache, f/c, cough.       ADDITIONAL REVIEW OF SYSTEMS: Negative except for above    MEDICATIONS  (STANDING):  atorvastatin 40 milliGRAM(s) Oral at bedtime  pantoprazole  Injectable 40 milliGRAM(s) IV Push every 12 hours  sodium chloride 0.9%. 1000 milliLiter(s) (75 mL/Hr) IV Continuous <Continuous>    MEDICATIONS  (PRN):  acetaminophen     Tablet .. 650 milliGRAM(s) Oral every 6 hours PRN Temp greater or equal to 38C (100.4F), Mild Pain (1 - 3)  aluminum hydroxide/magnesium hydroxide/simethicone Suspension 30 milliLiter(s) Oral every 4 hours PRN Dyspepsia  melatonin 3 milliGRAM(s) Oral at bedtime PRN Insomnia  ondansetron Injectable 4 milliGRAM(s) IV Push every 8 hours PRN Nausea and/or Vomiting      CAPILLARY BLOOD GLUCOSE        I&O's Summary    2022 07:01  -  2022 07:00  --------------------------------------------------------  IN: 0 mL / OUT: 500 mL / NET: -500 mL        PHYSICAL EXAM:  Vital Signs Last 24 Hrs  T(C): 36.7 (2022 09:45), Max: 36.7 (2022 01:38)  T(F): 98.1 (2022 09:45), Max: 98.1 (2022 09:45)  HR: 72 (2022 06:01) (72 - 82)  BP: 108/64 (2022 06:01) (106/61 - 124/77)  BP(mean): --  RR: 16 (2022 06:01) (15 - 20)  SpO2: 96% (2022 06:01) (96% - 98%)    PHYSICAL EXAM:  GENERAL: NAD, well-developed, nontoxic   HEAD:  Atraumatic, Normocephalic  EYES:  conjunctiva and sclera clear  NECK: Supple, No JVD  CHEST/LUNG: Clear to auscultation bilaterally; No wheeze  HEART: Regular rate and rhythm; No murmurs, rubs, or gallops  ABDOMEN: Soft, Nontender, Nondistended; Bowel sounds present  EXTREMITIES:  2+ Peripheral Pulses, No clubbing, cyanosis, or edema  PSYCH: AAOx3  NEUROLOGY: non-focal  SKIN: No rashes or lesions      LABS:                        11.6   10.55 )-----------( 266      ( 2022 07:14 )             34.1         136  |  101  |  40<H>  ----------------------------<  111<H>  3.5   |  21<L>  |  0.88    Ca    9.5      2022 07:14  Phos  4.0       Mg     1.7         TPro  7.1  /  Alb  4.2  /  TBili  0.4  /  DBili  x   /  AST  26  /  ALT  35  /  AlkPhos  52  -    PT/INR - ( 2022 21:16 )   PT: 13.6 sec;   INR: 1.14 ratio         PTT - ( 2022 21:16 )  PTT:24.8 sec      Urinalysis Basic - ( 2022 00:20 )    Color: Light Yellow / Appearance: Clear / S.025 / pH: x  Gluc: x / Ketone: Small  / Bili: Negative / Urobili: Negative   Blood: x / Protein: Trace / Nitrite: Negative   Leuk Esterase: Negative / RBC: 1 /hpf / WBC 1 /HPF   Sq Epi: x / Non Sq Epi: 0 /hpf / Bacteria: Negative          RADIOLOGY & ADDITIONAL TESTS:    Imaging Personally Reviewed:    Electrocardiogram Personally Reviewed:    COORDINATION OF CARE:  Care Discussed with Consultants/Other Providers [Y/N]:  Prior or Outpatient Records Reviewed [Y/N]:  
[facility] Division of Hospital Medicine  Micheline Lui MD  Pager (BOGDAN-LUX, 1A-5X): [phone]  Other Times:  [phone]    Patient is a 76y old  Male who presents with a chief complaint of Presyncope in the setting of melena (21 Jan 2022 05:34)      SUBJECTIVE / OVERNIGHT EVENTS:  afebrile . denies any Bm, denies any SOB or dizziness.     ADDITIONAL REVIEW OF SYSTEMS: otherwise neg    MEDICATIONS  (STANDING):  aspirin enteric coated 81 milliGRAM(s) Oral daily  atorvastatin 40 milliGRAM(s) Oral at bedtime  pantoprazole Infusion 8 mG/Hr (10 mL/Hr) IV Continuous <Continuous>  sodium chloride 0.9%. 1000 milliLiter(s) (75 mL/Hr) IV Continuous <Continuous>  sucralfate 1 Gram(s) Oral four times a day    MEDICATIONS  (PRN):  acetaminophen     Tablet .. 650 milliGRAM(s) Oral every 6 hours PRN Temp greater or equal to 38C (100.4F), Mild Pain (1 - 3)  aluminum hydroxide/magnesium hydroxide/simethicone Suspension 30 milliLiter(s) Oral every 4 hours PRN Dyspepsia  melatonin 3 milliGRAM(s) Oral at bedtime PRN Insomnia  ondansetron Injectable 4 milliGRAM(s) IV Push every 8 hours PRN Nausea and/or Vomiting      CAPILLARY BLOOD GLUCOSE        I&O's Summary    20 Jan 2022 07:01  -  21 Jan 2022 07:00  --------------------------------------------------------  IN: 2080 mL / OUT: 300 mL / NET: 1780 mL    21 Jan 2022 07:01  -  21 Jan 2022 15:21  --------------------------------------------------------  IN: 120 mL / OUT: 0 mL / NET: 120 mL        PHYSICAL EXAM:  Vital Signs Last 24 Hrs  T(C): 36.6 (21 Jan 2022 13:54), Max: 36.9 (21 Jan 2022 05:52)  T(F): 97.9 (21 Jan 2022 13:54), Max: 98.4 (21 Jan 2022 05:52)  HR: 81 (21 Jan 2022 05:52) (81 - 84)  BP: 111/72 (21 Jan 2022 05:52) (111/72 - 114/80)  BP(mean): --  RR: 18 (21 Jan 2022 05:52) (18 - 18)  SpO2: 96% (21 Jan 2022 05:52) (95% - 96%)    CONSTITUTIONAL: NAD, well-groomed  EYES:  conjunctiva and sclera clear  ENMT: Moist oral mucosa  NECK: Supple, no palpable masses; no JVD  RESPIRATORY: Normal respiratory effort; lungs are clear to auscultation bilaterally  CARDIOVASCULAR: Regular rate and rhythm, normal S1 and S2, no murmur/rub/gallop; No lower extremity edema  ABDOMEN: Nontender to palpation, normoactive bowel sounds, no rebound/guarding  MUSCULOSKELETAL:  no clubbing or cyanosis of digits; no joint swelling or tenderness to palpation  PSYCH: A+O to person, place, and time; affect appropriate  SKIN: No rashes; no palpable lesions    LABS:                        8.7    6.50  )-----------( 260      ( 21 Jan 2022 07:20 )             25.6     01-20    138  |  106  |  11  ----------------------------<  108<H>  3.5   |  20<L>  |  0.76    Ca    8.1<L>      20 Jan 2022 06:54                  RADIOLOGY & ADDITIONAL TESTS:  Results Reviewed:   Imaging Personally Reviewed:  Electrocardiogram Personally Reviewed:    COORDINATION OF CARE:  Care Discussed with Consultants/Other Providers [Y/N]:  Prior or Outpatient Records Reviewed [Y/N]:  
[facility] Division of Hospital Medicine  Flakita Eid MD  Pager (BOGDAN-LUX, 1O-1S): [phone]  Other Times:  [phone]      Patient is a 76y old  Male who presents with a chief complaint of Presyncope in the setting of melena (22 Jan 2022 12:20)      SUBJECTIVE / OVERNIGHT EVENTS:  used for translation. Patient without complaints. Tolerating regular diet well with no issues. no nauesa/vomiting, abd pain. no further episodes of melena.   ADDITIONAL REVIEW OF SYSTEMS:    MEDICATIONS  (STANDING):  aspirin enteric coated 81 milliGRAM(s) Oral daily  atorvastatin 40 milliGRAM(s) Oral at bedtime  pantoprazole    Tablet 40 milliGRAM(s) Oral every 12 hours  sodium chloride 0.9%. 1000 milliLiter(s) (75 mL/Hr) IV Continuous <Continuous>  sucralfate 1 Gram(s) Oral four times a day    MEDICATIONS  (PRN):  acetaminophen     Tablet .. 650 milliGRAM(s) Oral every 6 hours PRN Temp greater or equal to 38C (100.4F), Mild Pain (1 - 3)  aluminum hydroxide/magnesium hydroxide/simethicone Suspension 30 milliLiter(s) Oral every 4 hours PRN Dyspepsia  melatonin 3 milliGRAM(s) Oral at bedtime PRN Insomnia  ondansetron Injectable 4 milliGRAM(s) IV Push every 8 hours PRN Nausea and/or Vomiting      CAPILLARY BLOOD GLUCOSE        I&O's Summary    21 Jan 2022 07:01  -  22 Jan 2022 07:00  --------------------------------------------------------  IN: 1240 mL / OUT: 1000 mL / NET: 240 mL    22 Jan 2022 07:01  -  22 Jan 2022 13:40  --------------------------------------------------------  IN: 360 mL / OUT: 450 mL / NET: -90 mL        PHYSICAL EXAM:  Vital Signs Last 24 Hrs  T(C): 36.4 (22 Jan 2022 11:48), Max: 36.9 (21 Jan 2022 20:48)  T(F): 97.6 (22 Jan 2022 11:48), Max: 98.4 (21 Jan 2022 20:48)  HR: 89 (22 Jan 2022 11:48) (80 - 89)  BP: 135/71 (22 Jan 2022 11:48) (115/65 - 135/71)  BP(mean): --  RR: 18 (22 Jan 2022 11:48) (18 - 18)  SpO2: 98% (22 Jan 2022 11:48) (95% - 98%)    CONSTITUTIONAL: NAD, well-developed, well-groomed  EYES: PERRLA; conjunctiva and sclera clear  ENMT: Moist oral mucosa, no pharyngeal injection or exudates; normal dentition  NECK: Supple, no palpable masses; no thyromegaly  RESPIRATORY: Normal respiratory effort; lungs are clear to auscultation bilaterally  CARDIOVASCULAR: Regular rate and rhythm, normal S1 and S2, no murmur/rub/gallop; No lower extremity edema; Peripheral pulses are 2+ bilaterally  ABDOMEN: Soft, Nondistended, Nontender to palpation, normoactive bowel sounds  MUSCULOSKELETAL:  No clubbing or cyanosis of digits; no joint swelling or tenderness to palpation  PSYCH: A+O to person, place, and time; affect appropriate  NEUROLOGY: CN 2-12 are intact and symmetric; no gross sensory deficits   SKIN: No rashes; no palpable lesions    LABS:                        8.2    5.79  )-----------( 269      ( 22 Jan 2022 07:14 )             24.5         RADIOLOGY & ADDITIONAL TESTS:  Results Reviewed: H/H stable  Imaging Personally Reviewed:  Electrocardiogram Personally Reviewed:    COORDINATION OF CARE:  Care Discussed with Consultants/Other Providers [Y/N]: medicine ACP Ashleigh  Prior or Outpatient Records Reviewed [Y/N]:  
[facility] Division of Hospital Medicine  Micheline Lui MD  Pager (BOGDAN-F, 1A-5O): [phone]  Other Times:  [phone]    Patient is a 76y old  Male who presents with a chief complaint of Presyncope in the setting of melena (18 Jan 2022 15:06)      SUBJECTIVE / OVERNIGHT EVENTS:  no acute overnight issues . afebrile.     ADDITIONAL REVIEW OF SYSTEMS: otherwise neg    MEDICATIONS  (STANDING):  aspirin enteric coated 81 milliGRAM(s) Oral daily  atorvastatin 40 milliGRAM(s) Oral at bedtime  pantoprazole Infusion 8 mG/Hr (10 mL/Hr) IV Continuous <Continuous>  sodium chloride 0.9%. 1000 milliLiter(s) (75 mL/Hr) IV Continuous <Continuous>  sucralfate 1 Gram(s) Oral four times a day    MEDICATIONS  (PRN):  acetaminophen     Tablet .. 650 milliGRAM(s) Oral every 6 hours PRN Temp greater or equal to 38C (100.4F), Mild Pain (1 - 3)  aluminum hydroxide/magnesium hydroxide/simethicone Suspension 30 milliLiter(s) Oral every 4 hours PRN Dyspepsia  melatonin 3 milliGRAM(s) Oral at bedtime PRN Insomnia  ondansetron Injectable 4 milliGRAM(s) IV Push every 8 hours PRN Nausea and/or Vomiting      CAPILLARY BLOOD GLUCOSE        I&O's Summary    18 Jan 2022 07:01  -  19 Jan 2022 07:00  --------------------------------------------------------  IN: 900 mL / OUT: 750 mL / NET: 150 mL    19 Jan 2022 07:01  -  19 Jan 2022 15:01  --------------------------------------------------------  IN: 0 mL / OUT: 0 mL / NET: 0 mL        PHYSICAL EXAM:  Vital Signs Last 24 Hrs  T(C): 36.4 (19 Jan 2022 11:56), Max: 36.9 (19 Jan 2022 04:15)  T(F): 97.5 (19 Jan 2022 10:35), Max: 98.4 (19 Jan 2022 04:15)  HR: 85 (19 Jan 2022 13:28) (82 - 104)  BP: 141/85 (19 Jan 2022 13:28) (101/62 - 141/85)  BP(mean): --  RR: 12 (19 Jan 2022 13:28) (12 - 18)  SpO2: 99% (19 Jan 2022 13:28) (95% - 100%)    CONSTITUTIONAL: NAD, well-groomed  EYES:  conjunctiva and sclera clear  ENMT: Moist oral mucosa  NECK: Supple, no palpable masses; no JVD  RESPIRATORY: Normal respiratory effort; lungs are clear to auscultation bilaterally  CARDIOVASCULAR: Regular rate and rhythm, normal S1 and S2, no murmur/rub/gallop; No lower extremity edema  ABDOMEN: Nontender to palpation, normoactive bowel sounds, no rebound/guarding  MUSCULOSKELETAL:  no clubbing or cyanosis of digits; no joint swelling or tenderness to palpation  PSYCH: A+O to person, place, and time; affect appropriate  SKIN: No rashes; no palpable lesions    LABS:                        8.1    6.25  )-----------( 249      ( 19 Jan 2022 07:26 )             23.7     01-19    139  |  105  |  24<H>  ----------------------------<  118<H>  3.7   |  22  |  0.84    Ca    8.5      19 Jan 2022 07:24                Culture - Urine (collected 17 Jan 2022 06:41)  Source: Clean Catch Clean Catch (Midstream)  Final Report (18 Jan 2022 09:06):    <10,000 CFU/mL Normal Urogenital Ashley    Culture - Blood (collected 17 Jan 2022 02:15)  Source: .Blood Blood-Peripheral  Preliminary Report (18 Jan 2022 03:01):    No growth to date.    Culture - Blood (collected 17 Jan 2022 02:15)  Source: .Blood Blood-Venous  Preliminary Report (18 Jan 2022 03:01):    No growth to date.        RADIOLOGY & ADDITIONAL TESTS:  Results Reviewed:   Imaging Personally Reviewed:  Electrocardiogram Personally Reviewed:    COORDINATION OF CARE:  Care Discussed with Consultants/Other Providers [Y/N]:  Prior or Outpatient Records Reviewed [Y/N]:

## 2022-01-22 NOTE — PROGRESS NOTE ADULT - PROBLEM SELECTOR PLAN 1
- h/h now stable.    - s/p EGD (1/19) showing active duodenal ulcer bleed and other nonbleeding ulcers   -  on Protonix gtt (1/19) x 3 days. cont until 1/22 then PO BID.  - sucrafate QID   - trend CBC daily now  ,transfuse for hgb<7 or if HD instability  - ok to resume on ASA (last PCI 2008)
- Hb stable  - s/p EGD (1/19) showing active duodenal ulcer bleed and other nonbleeding ulcers   - completed protonix gtt x 72 hours this morning 1/22  - transition to pantoprazole 40mg PO BID x 8 weeks on discharge  - c/w sucralfate 1g QID x 2 weeks  - trend Hb daily  - ok to resume on ASA (last PCI 2008)
- h/h now stable. received 1 unit overnight after EGD hgb 6.6 now 8's   - s/p EGD (1/19) showing active duodenal ulcer bleed and other nonbleeding ulcers   -  on Protonix gtt (1/19) x 3 days. cont until 1/21 then PO BID.  - trend CBC daily now  ,transfuse for hgb<7 or if HD instability  - ok to resume on ASA (last PCI 2008)
-hgb downtrending  still with melena. +orthostatics.   - s/p EGD (1/19) showing active duodenal ulcer bleed and other nonbleeding ulcers   - started on Protonix gtt this morning. cont until 1/21 then PO BID.  -trend cbc q6 hours  ,transfuse for hgb<7 or if HD instability  - ok to resume on ASA (last PCI 2008)
likely UGIB if setting of melena and elevated bun/creatinine ratio, hx of gastric ulcer  -hgb downtrending 11.6 from 13 on admit  -HD stable, monitor vitals  -no further melena reported per patient  -trend cbc q6 hours  -discussed with GI, plan for EGD Wednesday or late add on Tuesday but needs cards clearance  -discussed with GI, can be on clears today, NPO after MN and can c/w ASA  -transfuse for hgb<7 or if HD instability  -increase ppi to 40mg IV q12
likely UGIB if setting of melena and elevated bun/creatinine ratio, hx of gastric ulcer  -hgb downtrending slowly still with cont melena. +orthostatics.   -trend cbc q6 hours to continue given above  -plan for EGD 1/19- card clearance done .  -transfuse for hgb<7 or if HD instability  -cont ppi to 40mg IV q12

## 2022-01-22 NOTE — PROGRESS NOTE ADULT - NSPROGADDITIONALINFOA_GEN_ALL_CORE
.  Flakita Eid MD  Division of Hospital Medicine  [facility]   Pager: [phone]    Hb stable. Completed PPI gtt x 72 hours this morning.  Will discharge one PO pantoprazole 40mg BID x 8 weeks and sulcralfate 1g QID x 2 weeks as per GI recs.  Discharge planning time spent: 38 minutes.    Plan discussed with patient via  and medicine ACP Becca
d/w ACP  d/c planning likely 1/22 once off PPI gtt
d/w Sarah
d/w ACP
d/w ACP

## 2022-01-22 NOTE — DISCHARGE NOTE PROVIDER - NSDCCPCAREPLAN_GEN_ALL_CORE_FT
PRINCIPAL DISCHARGE DIAGNOSIS  Diagnosis: Melena  Assessment and Plan of Treatment:       SECONDARY DISCHARGE DIAGNOSES  Diagnosis: Near syncope  Assessment and Plan of Treatment:     Diagnosis: PATY (acute kidney injury)  Assessment and Plan of Treatment:     Diagnosis: CAD (coronary artery disease)  Assessment and Plan of Treatment:      PRINCIPAL DISCHARGE DIAGNOSIS  Diagnosis: Melena  Assessment and Plan of Treatment: - Call your doctor if you see blood in your stool- Seek medical attention if you experience fainting, excessive sweating, fast heart beat, shortness of breath and abdominal pain      SECONDARY DISCHARGE DIAGNOSES  Diagnosis: Near syncope  Assessment and Plan of Treatment: Seek medical attention    Diagnosis: PATY (acute kidney injury)  Assessment and Plan of Treatment: Avoid taking (NSAIDs) - (ex: Ibuprofen, Advil, Celebrex, Naprosyn)  Avoid taking any nephrotoxic agents (can harm kidneys) - Intravenous contrast for diagnostic testing, combination cold medications.  Have all medications adjusted for your renal function by your Health Care Provider.  Blood pressure control is important.  Take all medication as prescribed.      Diagnosis: CAD (coronary artery disease)  Assessment and Plan of Treatment: Coronary artery disease is a condition where the arteries the supply the heart muscle get clogges with fatty deposits & puts you at risk for a heart attack  Call your doctor if you have any new pain, pressure, or discomfort in the center of your chest, pain, tingling or discomfort in arms, back, neck, jaw, or stomach, shortness of breath, nausea, vomiting, burping or heartburn, sweating, cold and clammy skin, racing or abnormal heartbeat for more than 10 minutes or if they keep coming & going.  Call 911 and do not tr to get to hospital by care  You can help yourself with lefestyle changes (quitting smoking if you smoke), eat lots of fruits & vegetables & low fat dairy products, not a lot of meat & fatty foods, walk or some form of physical activity most days of the week, lose weight if you are overweight  Take your cardiac medication as prescribed to lower cholesterol, to lower blood pressure, aspirin to prevent blood clots, and diabetes control  Make sure to keep appointments with doctor for cardiac follow up care       PRINCIPAL DISCHARGE DIAGNOSIS  Diagnosis: Melena  Assessment and Plan of Treatment: - Call your doctor if you see blood in your stool- Seek medical attention if you experience fainting, excessive sweating, fast heart beat, shortness of breath and abdominal pain      SECONDARY DISCHARGE DIAGNOSES  Diagnosis: Near syncope  Assessment and Plan of Treatment: - Call your doctor if you faint multiple times  -develop chest pain or irregular heart beat  - experience dizziness or cold sweats    Diagnosis: PATY (acute kidney injury)  Assessment and Plan of Treatment: Avoid taking (NSAIDs) - (ex: Ibuprofen, Advil, Celebrex, Naprosyn)  Avoid taking any nephrotoxic agents (can harm kidneys) - Intravenous contrast for diagnostic testing, combination cold medications.  Have all medications adjusted for your renal function by your Health Care Provider.  Blood pressure control is important.  Take all medication as prescribed.      Diagnosis: CAD (coronary artery disease)  Assessment and Plan of Treatment: Coronary artery disease is a condition where the arteries the supply the heart muscle get clogges with fatty deposits & puts you at risk for a heart attack  Call your doctor if you have any new pain, pressure, or discomfort in the center of your chest, pain, tingling or discomfort in arms, back, neck, jaw, or stomach, shortness of breath, nausea, vomiting, burping or heartburn, sweating, cold and clammy skin, racing or abnormal heartbeat for more than 10 minutes or if they keep coming & going.  Call 911 and do not tr to get to hospital by care  You can help yourself with lefestyle changes (quitting smoking if you smoke), eat lots of fruits & vegetables & low fat dairy products, not a lot of meat & fatty foods, walk or some form of physical activity most days of the week, lose weight if you are overweight  Take your cardiac medication as prescribed to lower cholesterol, to lower blood pressure, aspirin to prevent blood clots, and diabetes control  Make sure to keep appointments with doctor for cardiac follow up care

## 2022-01-22 NOTE — PROGRESS NOTE ADULT - ASSESSMENT
75 yo M with HTN, CAD s/p FRANCO in 2008 (on aspirin), and known gastric ulcer (diagnosed in 1999) who presents with near syncope in the setting melena admitted with acute blood loss anemia 2/2 bleeding duodenal ulcer found on EGD now s/p PPI gtt x 72 hours.

## 2022-01-22 NOTE — DISCHARGE NOTE PROVIDER - CARE PROVIDER_API CALL
Yifan Wolf (MD)  Gastroenterology  [address] [place] [address], [address]  [place] [address]  Phone: [phone]  Fax: [phone]  Follow Up Time:

## 2022-01-22 NOTE — PROGRESS NOTE ADULT - PROBLEM SELECTOR PLAN 2
- likely secondary to hypovolumic from melena  - orthostatic  now positive.   --c/w IVF, transfuse if needed.   -TTE  without significant change or findings.   -cards consult appreciated-
- likely secondary to hypovolumic from melena  -TTE  without significant change or findings.   -cards consult appreciated-
- likely secondary to hypovolumic from melena  --c/w IVF, transfuse if needed.   -TTE  without significant change or findings.   -cards consult appreciated-
- likely secondary to hypovolemic from melena  - TTE  without significant change or findings  - cards consult appreciated
plan as above
- likely secondary to hypovolumic from melena  - orthostatic previously neg but now positive.   --c/w IVF, transfuse if needed.   -TTE pending  -cards consult appreciated- can proceed with EGD.

## 2022-01-26 LAB — SURGICAL PATHOLOGY STUDY: SIGNIFICANT CHANGE UP

## 2022-02-01 ENCOUNTER — NON-APPOINTMENT (OUTPATIENT)
Age: 77
End: 2022-02-01

## 2022-02-02 PROBLEM — Z00.00 ENCOUNTER FOR PREVENTIVE HEALTH EXAMINATION: Status: ACTIVE | Noted: 2022-02-02

## 2023-01-17 NOTE — ED ADULT TRIAGE NOTE - WEIGHT METHOD
Operative Note      Patient: Harmony Dixon  YOB: 1944  MRN: [id number]    Date of Procedure: 1/17/2023    Pre-Op Diagnosis: Gastrointestinal hemorrhage, unspecified gastrointestinal hemorrhage type [K92.2]    Post-Op Diagnosis: Same and gastric mass with satellite lesions        Procedure(s):  Esophagogastroduodenoscopy with brushings    Surgeon(s):  Sita Menard MD    Assistant:   Aydee Ricketst MD    Anesthesia: IV Sedation    Estimated Blood Loss (mL): Minimal    Complications: None        Drains:   NG/OG/NJ/NE Tube Orogastric Center mouth (Active)   Surrounding Skin Clean, dry & intact 01/17/23 1200   Securement device Tape 01/17/23 1200   Status Continuous feeding 01/17/23 1200   Placement Verified X-Ray (repeat); External Catheter Length;Gastric Contents 01/17/23 1200   NG/OG/NJ/NE External Measurement (cm) 70 cm 01/17/23 1200   Drainage Appearance None 01/17/23 1200   Tube Feeding Other Tube Feeding (must specify product in comment) 01/17/23 1200   Tube feeding/verify rate (mL/hr) 45 mL/hr 01/16/23 2000   Tube Feeding Intake (mL) 419 ml 01/17/23 0600   Tube Feeding Supplement Amount (mL) 60 01/15/23 0600   Free Water/Flush (mL) 400 mL 01/17/23 0600   Output (mL) 0 ml 01/10/23 2200   Residual Volume (ml) 0 ml 01/17/23 0600       Urinary Catheter 01/10/23 (Active)   $ Urethral catheter insertion $ Not inserted for procedure 01/10/23 2000   Catheter Indications Assist in healing of open sacral or perineal wounds (Stage III, IV, or unstageable documented by a provider or enterostomal therapy) and/or full thickness perineal and lower extremity burns in incontinent patients 01/17/23 1200   Site Assessment Pink;Urethral drainage 01/17/23 1200   Urine Color Straw 01/17/23 1200   Urine Appearance Clear 01/17/23 1200   Urine Odor Malodorous 01/17/23 1200   Collection Container Standard 01/17/23 1200   Securement Method Leg strap 01/17/23 1200   Catheter Care Completed Yes 01/17/23 0800   Catheter Best Practices  Drainage tube clipped to bed;Catheter secured to thigh; Tamper seal intact; Bag below bladder;Bag not on floor; Lack of dependent loop in tubing;Drainage bag less than half full 01/17/23 1200   Status Draining;Patent 01/17/23 1200   Output (mL) 55 mL 01/17/23 1307       [REMOVED] Urinary Catheter 01/10/23 Davis (Removed)   $ Urethral catheter insertion $ Not inserted for procedure 01/10/23 0533   Catheter Indications Need for fluid volume management of the critically ill patient in a critical care setting 01/10/23 0533   Urine Color Hattie 01/10/23 0533   Urine Appearance Cloudy 01/10/23 0533   Collection Container Standard 01/10/23 0533   Securement Method Securing device (Describe) 01/10/23 0533   Catheter Care Completed Yes 01/10/23 0533   Catheter Best Practices  Drainage tube clipped to bed 01/10/23 0533   Status Draining 01/10/23 0533   Output (mL) 150 mL 01/10/23 1845       [REMOVED] Fecal Management System 01/15/23 (Removed)   Stool Appearance Watery 01/16/23 0801   Stool Color Brown 01/16/23 0801   Position of Indicator Line Visible 01/16/23 0801   Balloon Volume Verified Yes 01/16/23 0801   Skin Assessment of the Anal Area Unremarkable 01/16/23 0801   Tube Irrigated No 01/16/23 0801   Irrigation Volume (mL) 100 01/15/23 2145   Stool Amount Small 01/16/23 0600   Fecal Management Tube Output 50 ml 01/16/23 0801       Findings: Mild duodenitis, diffuse gastritis, gastric mass with satellite lesions, lesions throughout the esophagus, esophageal varices, hiatal hernia    Detailed Description of Procedure: The patient was in the MICU and placed in the left lateral decubitus position. A bite block was placed in the patients mouth. After the initiation of versed and fentanyl , a gastroscope was inserted into the patient's mouth and passed into the esophagus and into the stomach.   The scope was advanced through the pylorus into the first and second portion of the duodenum making the note of mild duodenitis. The scope was then withdrawn back into the stomach. There is evidence of diffuse gastritis, gastric mass with satellite lesions, and a hiatal hernia. Brushings were taken of the gastric mass and satellite lesions. Decision was made not to biopsy due to patient being a cirrhotic, high bleeding risk due to need for therapeutic anticoagulation. The scope was then straightened. The scope was then withdrawn the entire length of the esophagus, making the note of a more lesions in the esophagus as well as esophageal varices. The scope was withdrawn entirely. The patient tolerated the procedure well and there were no complications. Plan:     Patient will need a repeat EGD with EUS with biopsy in the future  PPI/Carafate  Monitor hemoglobin  Okay to restart tube feeds. Okay to restarting argatroban      Electronically signed by Baron Hector MD on 1/17/2023 at 3:26 PM    [facility]   Attending Physician Statement:  I was present during the entire procedure and was actively supervising and directing the resident. There were no immediate complications.     Sotero Roa MD stated

## 2024-09-05 NOTE — DISCHARGE NOTE NURSING/CASE MANAGEMENT/SOCIAL WORK - NURSING SECTION COMPLETE
If you are a smoker, it is important for your health to stop smoking. Please be aware that second hand smoke is also harmful. Patient/Caregiver provided printed discharge information.

## 2024-10-18 NOTE — PROVIDER CONTACT NOTE (CRITICAL VALUE NOTIFICATION) - PERSON GIVING RESULT:
Gamal Ramos What Type Of Note Output Would You Prefer (Optional)?: Standard Output Has Your Skin Lesion Been Treated?: not been treated Is This A New Presentation, Or A Follow-Up?: Skin Lesion

## (undated) DEVICE — TUBING IV SET GRAVITY 3Y 100" MACRO

## (undated) DEVICE — SOL INJ NS 0.9% 500ML 2 PORT

## (undated) DEVICE — FORCEP RADIAL JAW 4 JUMBO 2.8MM 3.2MM 240CM ORANGE DISP

## (undated) DEVICE — NDL INJ SCLERO INTERJECT 23G

## (undated) DEVICE — NDL INJ SCLERO INTERJECT 25G

## (undated) DEVICE — PACK IV START WITH CHG

## (undated) DEVICE — CATH IV SAFE BC 20G X 1.16" (PINK)

## (undated) DEVICE — BITE BLOCK ADULT 20 X 27MM (GREEN)

## (undated) DEVICE — FOLEY HOLDER STATLOCK 2 WAY ADULT

## (undated) DEVICE — SYR ALLIANCE II INFLATION 60ML

## (undated) DEVICE — SUCTION YANKAUER NO CONTROL VENT

## (undated) DEVICE — TUBING SUCTION CONN 6FT STERILE

## (undated) DEVICE — BRUSH COLONOSCOPY CYTOLOGY

## (undated) DEVICE — TUBING SUCTION 20FT

## (undated) DEVICE — BIOPSY FORCEP RADIAL JAW 4 STANDARD WITH NEEDLE

## (undated) DEVICE — SENSOR O2 FINGER ADULT 24/CA

## (undated) DEVICE — CATH IV SAFE BC 22G X 1" (BLUE)